# Patient Record
Sex: MALE | Race: WHITE | Employment: FULL TIME | URBAN - METROPOLITAN AREA
[De-identification: names, ages, dates, MRNs, and addresses within clinical notes are randomized per-mention and may not be internally consistent; named-entity substitution may affect disease eponyms.]

---

## 2024-04-25 ENCOUNTER — APPOINTMENT (OUTPATIENT)
Dept: MRI IMAGING | Facility: HOSPITAL | Age: 64
End: 2024-04-25
Attending: EMERGENCY MEDICINE
Payer: COMMERCIAL

## 2024-04-25 ENCOUNTER — HOSPITAL ENCOUNTER (INPATIENT)
Facility: HOSPITAL | Age: 64
LOS: 1 days | Discharge: HOME | End: 2024-04-26
Attending: EMERGENCY MEDICINE | Admitting: HOSPITALIST
Payer: COMMERCIAL

## 2024-04-25 DIAGNOSIS — Z98.890 S/P LUMBAR MICRODISCECTOMY: Primary | ICD-10-CM

## 2024-04-25 DIAGNOSIS — M51.36 L4-L5 DISC BULGE: ICD-10-CM

## 2024-04-25 DIAGNOSIS — M54.16 LUMBAR RADICULOPATHY: ICD-10-CM

## 2024-04-25 PROBLEM — M79.605 LEFT LEG PAIN: Status: ACTIVE | Noted: 2024-04-25

## 2024-04-25 PROBLEM — R73.9 HYPERGLYCEMIA: Status: ACTIVE | Noted: 2024-04-25

## 2024-04-25 PROBLEM — M51.369 L4-L5 DISC BULGE: Status: ACTIVE | Noted: 2024-04-25

## 2024-04-25 LAB
ANION GAP SERPL CALC-SCNC: 8 MMOL/L (ref 0–18)
APTT PPP: 27.6 SECONDS (ref 23.3–35.6)
BASOPHILS # BLD AUTO: 0.02 X10(3) UL (ref 0–0.2)
BASOPHILS NFR BLD AUTO: 0.4 %
BUN BLD-MCNC: 11 MG/DL (ref 9–23)
BUN/CREAT SERPL: 13.8 (ref 10–20)
CALCIUM BLD-MCNC: 9.4 MG/DL (ref 8.7–10.4)
CHLORIDE SERPL-SCNC: 106 MMOL/L (ref 98–112)
CO2 SERPL-SCNC: 26 MMOL/L (ref 21–32)
CREAT BLD-MCNC: 0.8 MG/DL
CRP SERPL-MCNC: 0.5 MG/DL (ref ?–1)
DEPRECATED RDW RBC AUTO: 43.6 FL (ref 35.1–46.3)
EGFRCR SERPLBLD CKD-EPI 2021: 100 ML/MIN/1.73M2 (ref 60–?)
EOSINOPHIL # BLD AUTO: 0.1 X10(3) UL (ref 0–0.7)
EOSINOPHIL NFR BLD AUTO: 2.1 %
ERYTHROCYTE [DISTWIDTH] IN BLOOD BY AUTOMATED COUNT: 14.1 % (ref 11–15)
ERYTHROCYTE [SEDIMENTATION RATE] IN BLOOD: 22 MM/HR
GLUCOSE BLD-MCNC: 101 MG/DL (ref 70–99)
HCT VFR BLD AUTO: 43.9 %
HGB BLD-MCNC: 14.4 G/DL
IMM GRANULOCYTES # BLD AUTO: 0 X10(3) UL (ref 0–1)
IMM GRANULOCYTES NFR BLD: 0 %
INR BLD: 0.97 (ref 0.8–1.2)
LYMPHOCYTES # BLD AUTO: 1.28 X10(3) UL (ref 1–4)
LYMPHOCYTES NFR BLD AUTO: 27.2 %
MCH RBC QN AUTO: 27.9 PG (ref 26–34)
MCHC RBC AUTO-ENTMCNC: 32.8 G/DL (ref 31–37)
MCV RBC AUTO: 84.9 FL
MONOCYTES # BLD AUTO: 1.01 X10(3) UL (ref 0.1–1)
MONOCYTES NFR BLD AUTO: 21.5 %
MRSA DNA SPEC QL NAA+PROBE: NEGATIVE
NEUTROPHILS # BLD AUTO: 2.29 X10 (3) UL (ref 1.5–7.7)
NEUTROPHILS # BLD AUTO: 2.29 X10(3) UL (ref 1.5–7.7)
NEUTROPHILS NFR BLD AUTO: 48.8 %
OSMOLALITY SERPL CALC.SUM OF ELEC: 290 MOSM/KG (ref 275–295)
PLATELET # BLD AUTO: 193 10(3)UL (ref 150–450)
POTASSIUM SERPL-SCNC: 3.9 MMOL/L (ref 3.5–5.1)
PROTHROMBIN TIME: 13.5 SECONDS (ref 11.6–14.8)
RBC # BLD AUTO: 5.17 X10(6)UL
SODIUM SERPL-SCNC: 140 MMOL/L (ref 136–145)
WBC # BLD AUTO: 4.7 X10(3) UL (ref 4–11)

## 2024-04-25 PROCEDURE — 99255 IP/OBS CONSLTJ NEW/EST HI 80: CPT | Performed by: NEUROLOGICAL SURGERY

## 2024-04-25 PROCEDURE — 72148 MRI LUMBAR SPINE W/O DYE: CPT | Performed by: EMERGENCY MEDICINE

## 2024-04-25 PROCEDURE — 99223 1ST HOSP IP/OBS HIGH 75: CPT | Performed by: HOSPITALIST

## 2024-04-25 RX ORDER — MORPHINE SULFATE 2 MG/ML
1 INJECTION, SOLUTION INTRAMUSCULAR; INTRAVENOUS EVERY 2 HOUR PRN
Status: DISCONTINUED | OUTPATIENT
Start: 2024-04-25 | End: 2024-04-26

## 2024-04-25 RX ORDER — LEVOTHYROXINE SODIUM 0.05 MG/1
50 TABLET ORAL
Status: DISCONTINUED | OUTPATIENT
Start: 2024-04-25 | End: 2024-04-26

## 2024-04-25 RX ORDER — NAPROXEN 500 MG/1
500 TABLET ORAL EVERY 12 HOURS
Status: DISCONTINUED | OUTPATIENT
Start: 2024-04-26 | End: 2024-04-26

## 2024-04-25 RX ORDER — ENEMA 19; 7 G/133ML; G/133ML
1 ENEMA RECTAL ONCE AS NEEDED
Status: DISCONTINUED | OUTPATIENT
Start: 2024-04-25 | End: 2024-04-26

## 2024-04-25 RX ORDER — ALLOPURINOL 300 MG/1
300 TABLET ORAL DAILY
Status: DISCONTINUED | OUTPATIENT
Start: 2024-04-25 | End: 2024-04-26

## 2024-04-25 RX ORDER — KETOROLAC TROMETHAMINE 15 MG/ML
15 INJECTION, SOLUTION INTRAMUSCULAR; INTRAVENOUS EVERY 6 HOURS
Status: DISPENSED | OUTPATIENT
Start: 2024-04-25 | End: 2024-04-26

## 2024-04-25 RX ORDER — SENNOSIDES 8.6 MG
17.2 TABLET ORAL NIGHTLY PRN
Status: DISCONTINUED | OUTPATIENT
Start: 2024-04-25 | End: 2024-04-26

## 2024-04-25 RX ORDER — MORPHINE SULFATE 2 MG/ML
2 INJECTION, SOLUTION INTRAMUSCULAR; INTRAVENOUS EVERY 2 HOUR PRN
Status: DISCONTINUED | OUTPATIENT
Start: 2024-04-25 | End: 2024-04-26

## 2024-04-25 RX ORDER — ALLOPURINOL 300 MG/1
300 TABLET ORAL DAILY
COMMUNITY

## 2024-04-25 RX ORDER — METHYLPREDNISOLONE 4 MG/1
TABLET ORAL
Qty: 1 EACH | Refills: 0 | Status: SHIPPED | OUTPATIENT
Start: 2024-04-25

## 2024-04-25 RX ORDER — AMOXICILLIN AND CLAVULANATE POTASSIUM 875; 125 MG/1; MG/1
1 TABLET, FILM COATED ORAL 3 TIMES DAILY
Status: ON HOLD | COMMUNITY
Start: 2024-04-15 | End: 2024-04-25

## 2024-04-25 RX ORDER — HYDROCODONE BITARTRATE AND ACETAMINOPHEN 5; 325 MG/1; MG/1
2 TABLET ORAL EVERY 4 HOURS PRN
Status: DISCONTINUED | OUTPATIENT
Start: 2024-04-25 | End: 2024-04-26

## 2024-04-25 RX ORDER — PROCHLORPERAZINE EDISYLATE 5 MG/ML
5 INJECTION INTRAMUSCULAR; INTRAVENOUS EVERY 8 HOURS PRN
Status: DISCONTINUED | OUTPATIENT
Start: 2024-04-25 | End: 2024-04-26

## 2024-04-25 RX ORDER — LISINOPRIL 20 MG/1
20 TABLET ORAL DAILY
Status: DISCONTINUED | OUTPATIENT
Start: 2024-04-25 | End: 2024-04-26

## 2024-04-25 RX ORDER — KETOROLAC TROMETHAMINE 30 MG/ML
30 INJECTION, SOLUTION INTRAMUSCULAR; INTRAVENOUS ONCE
Status: COMPLETED | OUTPATIENT
Start: 2024-04-25 | End: 2024-04-25

## 2024-04-25 RX ORDER — DIAZEPAM 5 MG/ML
5 INJECTION, SOLUTION INTRAMUSCULAR; INTRAVENOUS ONCE
Status: COMPLETED | OUTPATIENT
Start: 2024-04-25 | End: 2024-04-25

## 2024-04-25 RX ORDER — LISINOPRIL 20 MG/1
20 TABLET ORAL DAILY
COMMUNITY

## 2024-04-25 RX ORDER — MORPHINE SULFATE 4 MG/ML
4 INJECTION, SOLUTION INTRAMUSCULAR; INTRAVENOUS ONCE
Status: COMPLETED | OUTPATIENT
Start: 2024-04-25 | End: 2024-04-25

## 2024-04-25 RX ORDER — BISACODYL 10 MG
10 SUPPOSITORY, RECTAL RECTAL
Status: DISCONTINUED | OUTPATIENT
Start: 2024-04-25 | End: 2024-04-26

## 2024-04-25 RX ORDER — METHYLPREDNISOLONE SODIUM SUCCINATE 125 MG/2ML
125 INJECTION, POWDER, LYOPHILIZED, FOR SOLUTION INTRAMUSCULAR; INTRAVENOUS ONCE
Status: COMPLETED | OUTPATIENT
Start: 2024-04-25 | End: 2024-04-25

## 2024-04-25 RX ORDER — MELATONIN
3 NIGHTLY PRN
Status: DISCONTINUED | OUTPATIENT
Start: 2024-04-25 | End: 2024-04-26

## 2024-04-25 RX ORDER — ONDANSETRON 2 MG/ML
4 INJECTION INTRAMUSCULAR; INTRAVENOUS EVERY 6 HOURS PRN
Status: DISCONTINUED | OUTPATIENT
Start: 2024-04-25 | End: 2024-04-26

## 2024-04-25 RX ORDER — ACETAMINOPHEN 325 MG/1
650 TABLET ORAL EVERY 4 HOURS PRN
Status: DISCONTINUED | OUTPATIENT
Start: 2024-04-25 | End: 2024-04-26

## 2024-04-25 RX ORDER — MORPHINE SULFATE 4 MG/ML
4 INJECTION, SOLUTION INTRAMUSCULAR; INTRAVENOUS EVERY 2 HOUR PRN
Status: DISCONTINUED | OUTPATIENT
Start: 2024-04-25 | End: 2024-04-26

## 2024-04-25 RX ORDER — ATORVASTATIN CALCIUM 20 MG/1
20 TABLET, FILM COATED ORAL DAILY
COMMUNITY

## 2024-04-25 RX ORDER — LEVOTHYROXINE SODIUM 0.05 MG/1
50 TABLET ORAL
COMMUNITY

## 2024-04-25 RX ORDER — MULTIVIT-MIN/IRON FUM/FOLIC AC 7.5 MG-4
1 TABLET ORAL DAILY
Status: ON HOLD | COMMUNITY
End: 2024-04-25

## 2024-04-25 RX ORDER — ATORVASTATIN CALCIUM 20 MG/1
20 TABLET, FILM COATED ORAL DAILY
Status: DISCONTINUED | OUTPATIENT
Start: 2024-04-25 | End: 2024-04-26

## 2024-04-25 RX ORDER — POLYETHYLENE GLYCOL 3350 17 G/17G
17 POWDER, FOR SOLUTION ORAL DAILY PRN
Status: DISCONTINUED | OUTPATIENT
Start: 2024-04-25 | End: 2024-04-26

## 2024-04-25 RX ORDER — HYDROCODONE BITARTRATE AND ACETAMINOPHEN 5; 325 MG/1; MG/1
1 TABLET ORAL EVERY 4 HOURS PRN
Status: DISCONTINUED | OUTPATIENT
Start: 2024-04-25 | End: 2024-04-26

## 2024-04-25 NOTE — RESPIRATORY THERAPY NOTE
DC ASSESSMENT:    Pt does not have a previous diagnosis of DC. Pt does not routinely use a CPAP device at home.     CPAP INITIATION:    Pt to be placed on CPAP: no

## 2024-04-25 NOTE — ED QUICK NOTES
Orders for admission, patient is aware of plan and ready to go upstairs. Any questions, please call ED RAFAEL Amaya at extension [phone].     Patient Covid vaccination status: Fully vaccinated     COVID Test Ordered in ED: None    COVID Suspicion at Admission: N/A    Running Infusions:  None    Mental Status/LOC at time of transport: a/o x 4    Other pertinent information: Pt is from home. Pt is independently ambulatory with steady gait.  CIWA score: N/A   NIH score:  N/A

## 2024-04-25 NOTE — CHRONIC PAIN
Pain Medicine Consult       CC: LBP with LLE radiating pain     HPI: Patient is a 64 year old male w PmHx of HTN, HLD, thyroid disease, who came to ED for left groing and LLE radiating pain, after getting out of his car. The pain described as sharp, is rated as a 6/10 on a NRS. At its best the pain is a 4/10 and at its worst the pain is a 10/10.  Pain is associated with tingling/numbness. Pt underwent Mri and revealed  L4-L5 disc extrusion compressing and effacing the left L5 peripheral nerve root. Pain service consulted for recs.     The patient specifically denies weight loss, changes in bathroom habits, fever, or chills.    Current Outpatient Medications   Medication Sig Dispense Refill    methylPREDNISolone (MEDROL) 4 MG Oral Tablet Therapy Pack Dosepack: take as directed 1 each 0       No Known Allergies    Past Medical History:    Essential hypertension    Gout    Hyperlipidemia    Thyroid disease       Patient Active Problem List   Diagnosis    Left leg pain    Hyperglycemia    L4-L5 disc bulge    Lumbar radiculopathy       Past Surgical History:   Procedure Laterality Date    Excis lumbar disk,one level         Social History     Socioeconomic History    Marital status:      Spouse name: Not on file    Number of children: Not on file    Years of education: Not on file    Highest education level: Not on file   Occupational History    Not on file   Tobacco Use    Smoking status: Never    Smokeless tobacco: Never   Substance and Sexual Activity    Alcohol use: Not Currently    Drug use: Never    Sexual activity: Not on file   Other Topics Concern    Not on file   Social History Narrative    Not on file     Social Determinants of Health     Financial Resource Strain: Low Risk  (3/26/2024)    Received from [facility]    Overall Financial Resource Strain (CARDIA)     Difficulty of Paying Living Expenses: Not hard at all   Food Insecurity: No Food Insecurity (4/25/2024)    Food  Insecurity     Food Insecurity: Never true   Transportation Needs: No Transportation Needs (4/25/2024)    Transportation Needs     Lack of Transportation: No   Physical Activity: Not on file   Stress: Not on file   Social Connections: Not on file   Housing Stability: Low Risk  (4/25/2024)    Housing Stability     Housing Instability: No     Housing Instability Emergency: Not on file       ROS:  Constitutional: denies weight loss, night sweats, fatigue  Eyes: denies visual changes, headache, eye pain, double vision  Ears, nose, mouth, and throat: denies runny nose, frequent nose bleeds, stuffy ears, ear pain, gingival bleeding, sore throat, gingival bleeding  Cardiovascular: denies chest pain, shortness of breath, orthopnea, palpitations, loss of consciousness  Respiratory: denies cough, sputum, wheezing, shortness of breath  Gastrointestinal: denies abdominal pain, bloating, cramping, nausea/vomitting, constipation  Musculoskeletal: denies joint swelling, crepitus, arthritis. + pain  Integumentary: denies pruritis, rashes, lesions, excessive dryness and/or discoloration  Neurological: denies headache, weakness, numbness, pins and needles  Psychiatric: denies depression, changes in sleep patterns, anxiety, difficulty concentrating  Endocrine: denies tremor, sweaty, slow, tired, polydipsia, polyuria  Hematologic/Lymphatic: denies gum bleeding, prolonged/excessive bleeding, petechiae  Allergic/Immunologic: denies difficulty breathing, swelling at the neck/groin       RADIOLOGY REPORTS:   PROCEDURE: MRI SPINE LUMBAR (CPT=72148)     Impression   CONCLUSION:     1. Advanced multilevel degenerative changes of the lumbar spine as detailed. Notable levels as follows:     2. L4-L5:  Inferiorly directed 1.7 cm left paracentral/subarticular zone disc extrusion, which results in severe left lateral recess stenosis and effacement of the traversing left L5 nerve root.  Please correlate for left L5 radiculopathy.   Additional  severe multifactorial spinal canal, moderate bilateral neural foraminal, and mild right lateral recess stenosis at this level.  3. L5-S1:  Moderate left lateral recess stenosis, which is related to a left paracentral/subarticular zone disc protrusion, which results in effacement of the traversing left S1 nerve root.  Additional moderate left and mild-to-moderate right neural  foraminal as well as minor spinal canal stenosis.  4. L2-L3:  Moderate spinal canal with mild-to-moderate right and mild left neural foraminal stenosis.  5. L3-L4:  Moderate right and mild left neural foraminal with mild spinal canal stenosis.     6. Endplate edematous degenerative changes at L4-L5 and to a lesser degree L2-L3.     7. Mild chronic T12 vertebral body compression deformity.     8. Nonspecific red marrow conversion throughout the cervical spine.  Findings can be seen in the setting of hematopoietic disturbances such as anemia or chronic smoking amongst other etiologies.     Dictated by (CST): Abelardo Downey MD on 4/25/2024 at 7:05 AM      Finalized by (CST): Abelardo Downey MD on 4/25/2024 at 7:13 AM         PHYSICAL EXAM:  /85 (BP Location: Left arm)   Pulse 65   Temp 97.5 °F (36.4 °C) (Oral)   Resp 18   Ht 6' 1\" (1.854 m)   Wt 273 lb 11.2 oz (124.1 kg)   SpO2 93%   BMI 36.11 kg/m²     The patient is in no distress. The patient is alert and oriented times three.  Mood and affect are normal.  Examination of the patient's skin and nails is grossly normal.  HEENT: Head is normocephalic, nontraumatic, no pinpoint pupils  CV: regular rate, no pedal edema  Resp: no audible wheezing, normal respiratory effort  Abdomen: Soft, nondistended  Lumbar: Normal lordosis  Negative  point tenderness above the right L2-5 facet joints.  Negative  point tenderness above the left L2-5 facet joints.  Negative  right SI tenderness.  Negative  left SI tenderness.  Negative  bilateral greater trochanteric bursa  tenderness.  Negative  straight leg raise testing on right at 20 degrees as it recreates the radicular symptoms.  Positive  straight leg raise testing on left at 15 degrees as it recreates the radicular symptoms  2+/4 patellar and achilles reflexes on the right  2+/4 patellar and achilles reflexes on the left  5/5 right lower extremity strength   5/5 left lower extremity strength   Sensation is grossly intact to light touch bilateral lower extremities    ASSESSMENT/PLAN:  The patient is a 64-year-old male with lumbar radiculopathy, lumbar disc protrusion:  -pt is not wanting conservative measures   -neuro sx on the case and planning for L4-5 laminectomy with left medial facetectomy, foraminotomy and discectomy in am  -will sign off at this time  -ILPM reviewed     A total of 45 minutes were spent face-to-face with the patient during this encounter and over half of that time was spent on counseling and coordination of care.  Wdw dr to and nursing team     NNEKA Hines  Interventional Pain Management

## 2024-04-25 NOTE — ED PROVIDER NOTES
Patient Seen in: [facility] Emergency Department    History     Chief Complaint   Patient presents with    Leg or Foot Injury     Pain        HPI    64-year-old male presents ER with complaint of left groin as well as leg pain and numbness.  Patient with a past medical history of hyperlipidemia, thyroid disease, gout, hypertension, diverticulitis.  Patient states that he had been self treating himself with antibiotics for what he thought to be epididymitis from groin pain and then Augmentin for left lower quadrant abdominal pain.  Patient states yesterday he got out of his car started to have hip pain rating down his left leg.  Patient complained of numbness rating down only down to his foot.  Patient states he has no history of bulging disc.  Patient denies any trauma.    History reviewed.   Past Medical History:    Essential hypertension    Gout    Hyperlipidemia    Thyroid disease       History reviewed.   Past Surgical History:   Procedure Laterality Date    Excis lumbar disk,one level           Medications :  (Not in a hospital admission)       No family history on file.    Smoking Status:   Social History     Socioeconomic History    Marital status:    Tobacco Use    Smoking status: Never    Smokeless tobacco: Never   Substance and Sexual Activity    Alcohol use: Not Currently    Drug use: Never       ROS  All pertinent positives for the review of systems are mentioned in the HPI  All other organ systems are reviewed and are negative.    Constitutional and vital signs reviewed.      Social History and Family History elements reviewed from today, pertinent positives to the presenting problem noted.    Physical Exam     ED Triage Vitals [04/25/24 0312]   /81   Pulse 64   Resp 18   Temp 97.7 °F (36.5 °C)   Temp src Oral   SpO2 97 %   O2 Device None (Room air)       All measures to prevent infection transmission during my interaction with the patient were taken. The patient was already wearing  a droplet mask on my arrival to the room. Personal protective equipment including droplet mask, eye protection, and gloves were worn throughout the duration of the exam.  Handwashing was performed prior to and after the exam.  Stethoscope and any equipment used during my examination was cleaned with super sani-cloth germicidal wipes following the exam.     Physical Exam  Vitals and nursing note reviewed.   Constitutional:       Appearance: He is well-developed.   HENT:      Head: Normocephalic and atraumatic.      Right Ear: External ear normal.      Left Ear: External ear normal.      Nose: Nose normal.   Eyes:      Conjunctiva/sclera: Conjunctivae normal.      Pupils: Pupils are equal, round, and reactive to light.   Cardiovascular:      Rate and Rhythm: Normal rate and regular rhythm.      Heart sounds: Normal heart sounds.   Pulmonary:      Effort: Pulmonary effort is normal.      Breath sounds: Normal breath sounds.   Abdominal:      General: Bowel sounds are normal.      Palpations: Abdomen is soft.   Musculoskeletal:         General: Normal range of motion.      Cervical back: Normal range of motion and neck supple.   Skin:     General: Skin is warm and dry.   Neurological:      General: No focal deficit present.      Mental Status: He is alert and oriented to person, place, and time.      Cranial Nerves: No cranial nerve deficit.      Sensory: Sensory deficit present.      Motor: No weakness.      Coordination: Coordination normal.      Gait: Gait normal.      Deep Tendon Reflexes: Reflexes are normal and symmetric. Reflexes normal.   Psychiatric:         Behavior: Behavior normal.         Thought Content: Thought content normal.         Judgment: Judgment normal.         ED Course        Labs Reviewed   BASIC METABOLIC PANEL (8) - Abnormal; Notable for the following components:       Result Value    Glucose 101 (*)     All other components within normal limits   SED RATE, WESTERGREN (AUTOMATED) - Abnormal;  Notable for the following components:    Sed Rate 22 (*)     All other components within normal limits   CBC W/ DIFFERENTIAL - Abnormal; Notable for the following components:    Monocyte Absolute 1.01 (*)     All other components within normal limits   C-REACTIVE PROTEIN - Normal   CBC WITH DIFFERENTIAL WITH PLATELET    Narrative:     The following orders were created for panel order CBC With Differential With Platelet.                  Procedure                               Abnormality         Status                                     ---------                               -----------         ------                                     CBC W/ DIFFERENTIAL[[id number]]          Abnormal            Final result                                                 Please view results for these tests on the individual orders.         Imaging Results Available and Reviewed while in ED: No results found.  ED Medications Administered:   Medications   ketorolac (Toradol) 30 MG/ML injection 30 mg (30 mg Intravenous Given 4/25/24 0347)   diazepam (Valium) 5 mg/mL injection 5 mg (5 mg Intravenous Given 4/25/24 0447)   methylPREDNISolone sodium succinate (Solu-MEDROL) injection 125 mg (125 mg Intravenous Given 4/25/24 0437)         MDM     Vitals:    04/25/24 0312 04/25/24 0353 04/25/24 0447 04/25/24 0545   BP: 160/81 139/85 142/84    Pulse: 64   61   Resp: 18      Temp: 97.7 °F (36.5 °C)      TempSrc: Oral      SpO2: 97%   99%   Weight: 122.5 kg      Height: 185.4 cm (6' 1\")        *I personally reviewed and interpreted all ED vitals.  I also personally reviewed all labs and imaging if ordered    Pulse Ox: 97%, Room air, Normal     Monitor Interpretation:   normal sinus rhythm    Differential Diagnosis/ Diagnostic Considerations: Bulging disc, lumbar radiculopathy, saddle anesthesia, cord compression.    Medical Record Review: I personally reviewed available prior medical records for any recent pertinent discharge summaries,  testing, and procedures and reviewed those reports.    Complicating Factors: The patient already has does not have a problem list on file. to contribute to the complexity of this ED evaluation.    Medical Decision Making  Problems Addressed:  Left leg pain: acute illness or injury    Amount and/or Complexity of Data Reviewed  Independent Historian:      Details: History obtained from the spouse who states that the pain has been spontaneously occurred since yesterday.  Patient without any trauma        Condition upon leaving the department: Stable    Disposition and Plan     Clinical Impression:  1. Left leg pain        Disposition:  There is no disposition on file for this visit.    Follow-up:  José Cadena MD  [address] [address]  [phone]    Schedule an appointment as soon as possible for a visit  If symptoms worsen      Medications Prescribed:  Current Discharge Medication List        START taking these medications    Details   methylPREDNISolone (MEDROL) 4 MG Oral Tablet Therapy Pack Dosepack: take as directed  Qty: 1 each, Refills: 0

## 2024-04-25 NOTE — H&P
[facility]    PATIENT'S NAME: JEROD RICCI   ATTENDING PHYSICIAN: Fredis Pang MD   PATIENT ACCOUNT#:   [id number]    LOCATION:  [address] [place] [address]  MEDICAL RECORD #:   [id number]       YOB: 1960  ADMISSION DATE:       04/25/2024    HISTORY AND PHYSICAL EXAMINATION    HISTORY OF PRESENT ILLNESS:  The patient is a 64-year-old male with a past medical history of hypertension who had come to the hospital endorsing left groin as well as left leg pain and numbness.  The patient does have a history of hyperlipidemia and thyroid disease and hypertension, as well as diverticulitis.  He has been self-treating himself with antibiotics for what he had initially thought to be epididymitis from the groin pain and then augmented for left lower quadrant abdominal pain.  Yesterday he got out of his car and then he started having pain down his left leg, and that is what brought him to the emergency department.  In the emergency department, the patient underwent an MRI of the lumbar spine and was found to have an L4-L5 disc extrusion compressing and effacing the left L5 peripheral nerve root; no signs of cord compression.  Found to have multilevel disc disease.  Neurosurgery was placed on consult.  The patient was admitted to the hospital and started on IV analgesics, IV antiemetics, and for further monitoring.  The patient was seen and examined on the medical floor, still endorsing pain, requesting pain.  Denied any chest pain, shortness of breath, palpitations, nausea, or vomiting at this time.    PAST MEDICAL HISTORY:  Positive for hypertension, gout, hyperlipidemia, history of thyroid disease, and history of diverticulitis.    PAST SURGICAL HISTORY:  The patient does have an excision of lumbar disc done in the past.    MEDICATIONS:  Home medications have been reviewed and reconciled.  Please refer to the patient's chart for a detailed review regarding the patient's home medications.     ALLERGIES:  No  known drug allergies.    FAMILY HISTORY:  Reviewed and noncontributory to the case.    SOCIAL HISTORY:  The patient is negative for illicits, tobacco, or alcohol.    REVIEW OF SYSTEMS:  A 10-point review of systems has been obtained and is otherwise negative.       PHYSICAL EXAMINATION:    GENERAL:  The patient is lying in bed.  He appears to be in mild distress at this time.  He is A and O x3.  VITAL SIGNS:  The patient's temperature is 97.7, pulse 61, respirations 18, blood pressure 134/75, saturating 96% on room air.  HEENT:  Extraocular movements are intact.  Pupils equal, round, and reactive to light and accommodation.  Atraumatic, normocephalic.    LUNGS:  Good air entry bilaterally.   CARDIAC:  S1, S2 appreciated.  ABDOMEN:  Soft, nontender, nondistended.  Positive bowel sounds.   EXTREMITIES:  Peripheral pulses are positive.  NEUROLOGIC:  The patient does have some sensory deficit present, but motor function is intact.    SKIN:  No rashes are noted.  PSYCHIATRIC:  Appropriate affect.    LABORATORY DATA:  The patient's glucose is 101, sodium 140, potassium 3.9, chloride 106, carbon dioxide 26, BUN 11, creatinine 0.8.  WBC's 4.7, hemoglobin 15.4, hematocrit 43.9, platelets are 193.  Sed rate is elevated to 22.      MRI results as above.      ASSESSMENT AND PLAN:  The patient is a 64-year-old male with a past medical history of hypertension, hypothyroidism, history of diverticulitis, who has been admitted to the hospital with intractable lower back pain.    1.   Intractable lower back pain.  MRI results have been reviewed, consistent with disc protrusion.  At this time we will continue IV analgesics, IV antiemetics as needed.  Neurosurgery has been placed on consult. We will defer to their recommendations. We will continue to follow the patient closely.   2. Hypertension.  If hypertensive, resume patient's oral antihypertensives.  3.   For history of gout, we will resume the patient's gout regimen.  4.   The  patient was self-treating himself for diverticulitis that he initially thought it would be due to that.  At this time, we will hold p.o. antibiotics.  We will continue to monitor closely.  4.   Venous thromboembolism prophylaxis.  We will hold pharmacologic VTE prophylaxis in the setting of possible intervention.    5.   Disposition:  At this time we will monitor closely.  The patient is a Full Code.  Further recommendations to follow.    Greater than 75 minutes was spent with greater than 50% of time spent in face-to-face.     Dictated By Fredis Pang MD  d: 04/25/2024 10:29:57  t: 04/25/2024 10:45:14  [facility] [id number]  [facility]/

## 2024-04-25 NOTE — PLAN OF CARE
Problem: Patient Centered Care  Goal: Patient preferences are identified and integrated in the patient's plan of care  Description: Interventions:  - What would you like us to know as we care for you?   - Provide timely, complete, and accurate information to patient/family  - Incorporate patient and family knowledge, values, beliefs, and cultural backgrounds into the planning and delivery of care  - Encourage patient/family to participate in care and decision-making at the level they choose  - Honor patient and family perspectives and choices  Outcome: Progressing     Problem: Patient/Family Goals  Goal: Patient/Family Long Term Goal  Description: Patient's Long Term Goal: Pain relief!    Interventions:  - Pain level is assess using pain scale from 1 to 10.  - Medicated as needed for pain or discomfort.  - Pain on consult.   - See additional Care Plan goals for specific interventions  Outcome: Progressing  Goal: Patient/Family Short Term Goal  Description: Patient's Short Term Goal: Safe & free of falls!    Interventions:   - Call light within reach at all times.   - Non-skid socks are worn at all times.   - See additional Care Plan goals for specific interventions  Outcome: Progressing     Patient is presently resting in the bed. Alert x 4. Vital signs taken and stable. Tolerates diet well. Patient receives intravenous antibiotics per order. Patient is self care and independent. Patient will be nothing by mouth after midnight for surgical procedure on tomorrow. Patient is medicated as needed for pain or discomfort. Consent for procedure has been signed and witnessed by this RN and placed in the chart. Call light within reach at all times.

## 2024-04-25 NOTE — PLAN OF CARE
KENNETH OLVERA M.D., F.A.A.N.S.     of Neurosurgery  [facility]  Board Certified Neurosurgeon                          [facility]  part of [facility]        [place] [facility]      [address]  [address]  [place] [address]    PHONE  [phone]          FAX  [phone]    [email or web address]      NEUROSURGERY INITIAL PLAN OF CARE    Maxwell Cameron Patient Status:  Observation    1960 MRN [id number]   Location [facility] 5SW/SE Attending Fredis Pang MD   Hosp Day # 0 PCP DANA LEHMAN, CALDER[place]     Patient with disc herniation at L4-5 and severe left lateral recess stenosis.  Discussed this with the pain service.  Patient is not interested in an epidural steroid injection and is quite debilitated secondary to the radiculopathy.  Plan for L4-5 laminectomy with left medial facetectomy, foraminotomy and discectomy utilizing the microscope tomorrow.        Kenneth Olvera M.D., F.A.A.N.S.    2024  10:38 AM    This note has been dictated utilizing voice recognition software. Unfortunately, this may lead to occasional typographical errors. If there are any questions regarding this, please do not hesitate to contact our office.

## 2024-04-25 NOTE — CONSULTS
[facility] Neurosurgery Consult    Maxwell Cameron Patient Status:  Observation    1960 MRN [id number]   Location [facility] 5SW/SE Attending Fredis Pang MD   Hosp Day # 0 PCP CALDERON CORTEZ MD       REASON FOR CONSULTATION: Lumbar radiculopathy, lumbar disc herniation       HISTORY OF PRESENT ILLNESS:  Maxwell Cameron is a(n) 64 year old male with a pertinent PMH of HTN and left L4-5 microdiscectomy in  at [facility].  The patient endorses no cardiac history or history of MI.  No history of stroke.  Endorses no daily use of antiplatelet/anticoagulation therapy.    Who presents to the ED endorsing left lower extremity pain, numbness and tingling.  MRI lumbar spine with inferiorly directed 1.7 cm left paracentral/subarticular zone disc extrusion, which results in severe left lateral recess stenosis and effacement of the traversing left L5 nerve root.  Please correlate for left L5 radiculopathy.  Additional severe multifactorial spinal canal, moderate bilateral neuroforaminal, and mild right lateral recess stenosis at this level. Patient with multi level lumbar DDD as noted per report, please see for further details.     Onset yesterday morning.  No inciting event.  The pain is located in the left glute with radiation down the outer and posterior aspect of the thigh and calf to the bottom of the foot.  Tingling at the bottom of the foot.  Endorses numbness as well.  No right lower extremity pain, numbness, tingling or weakness.  No low back pain, he states is more left glute pain.  No gait instability.  No neck pain or radiating upper extremity pain.  No hand numbness, tingling or weakness.  No bladder/bowel incontinence/retention.    PAST MEDICAL HISTORY:  Past Medical History:    Essential hypertension    Gout    Hyperlipidemia    Thyroid disease       PAST SURGICAL HISTORY:  Past Surgical History:   Procedure Laterality Date    Excis lumbar disk,one level         FAMILY HISTORY:  family history is  not on file.    SOCIAL HISTORY:   reports that he has never smoked. He has never used smokeless tobacco. He reports that he does not currently use alcohol. He reports that he does not use drugs.    ALLERGIES:  No Known Allergies    MEDICATIONS:  Medications Prior to Admission   Medication Sig Dispense Refill Last Dose    atorvastatin 20 MG Oral Tab Take 1 tablet (20 mg total) by mouth daily.   4/25/2024 at 0300    lisinopril 20 MG Oral Tab Take 1 tablet (20 mg total) by mouth daily.   4/25/2024 at 0300    allopurinol 300 MG Oral Tab Take 1 tablet (300 mg total) by mouth daily.   4/25/2024 at 0300    levothyroxine 50 MCG Oral Tab Take 1 tablet (50 mcg total) by mouth before breakfast.   4/25/2024 at 0300     Current Facility-Administered Medications   Medication Dose Route Frequency    acetaminophen (Tylenol) tab 650 mg  650 mg Oral Q4H PRN    Or    HYDROcodone-acetaminophen (Norco) 5-325 MG per tab 1 tablet  1 tablet Oral Q4H PRN    Or    HYDROcodone-acetaminophen (Norco) 5-325 MG per tab 2 tablet  2 tablet Oral Q4H PRN    morphINE PF 2 MG/ML injection 1 mg  1 mg Intravenous Q2H PRN    Or    morphINE PF 2 MG/ML injection 2 mg  2 mg Intravenous Q2H PRN    Or    morphINE PF 4 MG/ML injection 4 mg  4 mg Intravenous Q2H PRN    melatonin tab 3 mg  3 mg Oral Nightly PRN    polyethylene glycol (PEG 3350) (Miralax) 17 g oral packet 17 g  17 g Oral Daily PRN    sennosides (Senokot) tab 17.2 mg  17.2 mg Oral Nightly PRN    bisacodyl (Dulcolax) 10 MG rectal suppository 10 mg  10 mg Rectal Daily PRN    fleet enema (Fleet) 7-19 GM/118ML rectal enema 133 mL  1 enema Rectal Once PRN    ondansetron (Zofran) 4 MG/2ML injection 4 mg  4 mg Intravenous Q6H PRN    prochlorperazine (Compazine) 10 MG/2ML injection 5 mg  5 mg Intravenous Q8H PRN    ketorolac (Toradol) 15 MG/ML injection 15 mg  15 mg Intravenous Q6H    Followed by    [START ON 4/26/2024] naproxen (Naprosyn) tab 500 mg  500 mg Oral Q12H    allopurinol (Zyloprim) tab 300 mg   300 mg Oral Daily    atorvastatin (Lipitor) tab 20 mg  20 mg Oral Daily    levothyroxine (Synthroid) tab 50 mcg  50 mcg Oral Before breakfast    lisinopril (Prinivil; Zestril) tab 20 mg  20 mg Oral Daily    piperacillin-tazobactam (Zosyn) 4.5 g in dextrose 5% 100 mL IVPB-ADDV  4.5 g Intravenous Q8H       REVIEW OF SYSTEMS:  Comprehensive Review of Systems obtained, and is negative other than that mentioned in the History of Present Illness.      PHYSICAL EXAMINATION:  VITAL SIGNS: /85 (BP Location: Left arm)   Pulse 65   Temp 97.5 °F (36.4 °C) (Oral)   Resp 18   Ht 73\"   Wt 273 lb 11.2 oz (124.1 kg)   SpO2 93%   BMI 36.11 kg/m²   GENERAL:  Pleasant patient is a 64 year old male in no acute distress. Laying comfortably in bed.   HEENT:  Normocephalic, atraumatic  RESP: Easy and even   NEUROLOGICAL:  This patient is alert and orientated.  Speech fluent.  Comprehension intact.  Answers questions appropriately.  Easily follows commands.    Upper extremity strength:    Deltoid  Biceps  Triceps     Intrinsics      Right 5 5 5 5 5     Left 5 5 5 5 5     Lower extremity strength:     Iliopsoas  Hamstrings   Quads    D-flexion P-flexion Great Toe   Right       5         5       5         5 5 5   Left       5         5       5         5 5 5     No clonus  Bilateral upper and lower extremity sensation intact and symmetric, except endorses \"tingling\" during assessment of right plantar aspect of the foot    DIAGNOSTIC DATA:   Lab Results   Component Value Date    WBC 4.7 04/25/2024    HGB 14.4 04/25/2024    HCT 43.9 04/25/2024    .0 04/25/2024    CREATSERUM 0.80 04/25/2024    BUN 11 04/25/2024     04/25/2024    K 3.9 04/25/2024     04/25/2024    CO2 26.0 04/25/2024     04/25/2024    CA 9.4 04/25/2024    PTT 27.6 04/25/2024    INR 0.97 04/25/2024    PTP 13.5 04/25/2024    ESRML 22 04/25/2024    CRP 0.50 04/25/2024       IMAGING:    Study Result    Narrative   PROCEDURE: MRI SPINE LUMBAR  (CPT=72148)     COMPARISON: None.     INDICATIONS: eval for radicular symptoms left leg     TECHNIQUE: A variety of imaging planes and parameters were utilized for visualization of suspected pathology.     FINDINGS:  NUMERATION: For the purposes of this examination, the lowest fully formed disc space is designated as L5-S1.  ALIGNMENT: There is preservation of the expected lumbar lordosis.  BONES: No acute lumbar spine fracture.  Endplate edematous degenerative changes at L4-L5 and to a lesser degree L2-L3.  Mild chronic T12 vertebral body compression fracture.  Nonspecific red marrow conversion throughout the lumbar spine.  CORD/CAUDA EQUINA: The distal cord and nerve roots have normal caliber, contour, and signal intensity.  PARASPINAL AREA: No visible mass.    OTHER: Probable right renal cyst.     LUMBAR DISC LEVELS:  L1-L2: Diffuse disc bulge with superimposed central disc protrusion in addition to slight ligamentum flavum redundancy and mild bilateral facet arthropathy.  No significant spinal canal or neural foraminal stenosis.  L2-L3: Right eccentric disc bulge with ligamentum flavum redundancy, mild dorsal epidural lipomatosis, and mild bilateral facet arthropathy.  Mild-to-moderate right and mild left neural foraminal with moderate spinal canal stenosis.  L3-L4: Diffuse disc bulge with ligamentum flavum redundancy, mild dorsal epidural lipomatosis, and bilateral facet arthropathy.  Mild spinal canal with moderate right and mild left neural foraminal stenosis.  L4-L5: Large diffuse disc bulge with superimposed inferiorly directed 1.7 cm left paracentral/subarticular zone disc extrusion in addition to ligamentum flavum redundancy, mild dorsal epidural lipomatosis, and bilateral facet arthropathy.  Severe left  lateral recess stenosis with effacement of the traversing left L5 nerve root.  There is also severe multifactorial spinal canal stenosis.  Moderate bilateral neural foraminal and mild right lateral  recess stenosis.  L5-S1: Diffuse disc bulge with superimposed left paracentral/subarticular zone disc protrusion/extrusion in addition to ligamentum flavum redundancy and bilateral facet arthropathy.  Moderate left lateral recess stenosis with effacement of the traversing   left S1 nerve root.  There is also minor spinal canal stenosis.  Moderate left and mild-to-moderate right neural foraminal stenosis.               Impression   CONCLUSION:     1. Advanced multilevel degenerative changes of the lumbar spine as detailed. Notable levels as follows:     2. L4-L5:  Inferiorly directed 1.7 cm left paracentral/subarticular zone disc extrusion, which results in severe left lateral recess stenosis and effacement of the traversing left L5 nerve root.  Please correlate for left L5 radiculopathy.  Additional  severe multifactorial spinal canal, moderate bilateral neural foraminal, and mild right lateral recess stenosis at this level.  3. L5-S1:  Moderate left lateral recess stenosis, which is related to a left paracentral/subarticular zone disc protrusion, which results in effacement of the traversing left S1 nerve root.  Additional moderate left and mild-to-moderate right neural  foraminal as well as minor spinal canal stenosis.  4. L2-L3:  Moderate spinal canal with mild-to-moderate right and mild left neural foraminal stenosis.  5. L3-L4:  Moderate right and mild left neural foraminal with mild spinal canal stenosis.     6. Endplate edematous degenerative changes at L4-L5 and to a lesser degree L2-L3.     7. Mild chronic T12 vertebral body compression deformity.     8. Nonspecific red marrow conversion throughout the cervical spine.  Findings can be seen in the setting of hematopoietic disturbances such as anemia or chronic smoking amongst other etiologies.        elm-remote     Dictated by (CST): Abelardo Downey MD on 4/25/2024 at 7:05 AM      Finalized by (CST): Abelardo Downey MD on 4/25/2024 at 7:13 AM         ASSESSMENT:  Patient Active Problem List   Diagnosis    Left leg pain    Hyperglycemia    L4-L5 disc bulge    Lumbar radiculopathy     63 y/o male who presents to the ED with left LE pain, numbness and tingling, MRI with a large inferiorly directed L4-5 disc herniation with spinal stenosis. Plan to proceed with surgical intervention tomorrow with Dr. Liao  History of L4-5 left microdiscectomy in 2010 at [facility]      Plan:  Plan for neurosurgical intervention with Dr. Liao tomorrow  N.p.o. at midnight  Hold anticoagulation/antiplatelet therapy  PT/OT if patient able to tolerate  Ambulate 4 times daily as tolerated  Out of bed to chair for all meals as tolerated  Medical management per hospitalist  Pain management as ordered  Discussed with Dr. Liao    The patient was seen and examined.  Patient agreed to the plan, verbalized understanding was very appreciative.      Kenia Mondragon M.S., PA-C  [address], [address] [address]  [phone]  4/25/2024 12:53 PM    Dragon speech recognition software was used to prepare this note. If a word or phrase is confusing, it is likely due to a failure of recognition. Please contact me with any questions or clarifications.    Is this a shared or split note between Advanced Practice Provider and Physician? Yes

## 2024-04-25 NOTE — ED PROVIDER NOTES
MRI scan shows L4-L5 disc extrusion compressing and effacing the left L5 peripheral nerve root.  No signs of cord compression.  Multilevel disc disease.  Discussed results with patient.  Originally requested admission to the hospital for consideration for surgery and discussed with the hospitalist and neurosurgery.

## 2024-04-26 ENCOUNTER — ANESTHESIA (OUTPATIENT)
Dept: SURGERY | Facility: HOSPITAL | Age: 64
End: 2024-04-26
Payer: COMMERCIAL

## 2024-04-26 ENCOUNTER — APPOINTMENT (OUTPATIENT)
Dept: GENERAL RADIOLOGY | Facility: HOSPITAL | Age: 64
End: 2024-04-26
Attending: NEUROLOGICAL SURGERY
Payer: COMMERCIAL

## 2024-04-26 ENCOUNTER — ANESTHESIA EVENT (OUTPATIENT)
Dept: SURGERY | Facility: HOSPITAL | Age: 64
End: 2024-04-26
Payer: COMMERCIAL

## 2024-04-26 VITALS
HEART RATE: 64 BPM | HEIGHT: 73 IN | DIASTOLIC BLOOD PRESSURE: 80 MMHG | SYSTOLIC BLOOD PRESSURE: 139 MMHG | TEMPERATURE: 98 F | RESPIRATION RATE: 16 BRPM | BODY MASS INDEX: 36.27 KG/M2 | WEIGHT: 273.69 LBS | OXYGEN SATURATION: 98 %

## 2024-04-26 LAB
ANION GAP SERPL CALC-SCNC: 7 MMOL/L (ref 0–18)
BASOPHILS # BLD AUTO: 0.02 X10(3) UL (ref 0–0.2)
BASOPHILS NFR BLD AUTO: 0.3 %
BUN BLD-MCNC: 14 MG/DL (ref 9–23)
BUN/CREAT SERPL: 15.7 (ref 10–20)
CALCIUM BLD-MCNC: 9.5 MG/DL (ref 8.7–10.4)
CHLORIDE SERPL-SCNC: 106 MMOL/L (ref 98–112)
CO2 SERPL-SCNC: 27 MMOL/L (ref 21–32)
CREAT BLD-MCNC: 0.89 MG/DL
DEPRECATED RDW RBC AUTO: 45.3 FL (ref 35.1–46.3)
EGFRCR SERPLBLD CKD-EPI 2021: 97 ML/MIN/1.73M2 (ref 60–?)
EOSINOPHIL # BLD AUTO: 0.02 X10(3) UL (ref 0–0.7)
EOSINOPHIL NFR BLD AUTO: 0.3 %
ERYTHROCYTE [DISTWIDTH] IN BLOOD BY AUTOMATED COUNT: 14.4 % (ref 11–15)
GLUCOSE BLD-MCNC: 88 MG/DL (ref 70–99)
HCT VFR BLD AUTO: 45.2 %
HGB BLD-MCNC: 14.8 G/DL
IMM GRANULOCYTES # BLD AUTO: 0.02 X10(3) UL (ref 0–1)
IMM GRANULOCYTES NFR BLD: 0.3 %
LYMPHOCYTES # BLD AUTO: 2.04 X10(3) UL (ref 1–4)
LYMPHOCYTES NFR BLD AUTO: 26.9 %
MAGNESIUM SERPL-MCNC: 2 MG/DL (ref 1.6–2.6)
MCH RBC QN AUTO: 28.1 PG (ref 26–34)
MCHC RBC AUTO-ENTMCNC: 32.7 G/DL (ref 31–37)
MCV RBC AUTO: 85.9 FL
MONOCYTES # BLD AUTO: 0.98 X10(3) UL (ref 0.1–1)
MONOCYTES NFR BLD AUTO: 12.9 %
NEUTROPHILS # BLD AUTO: 4.5 X10 (3) UL (ref 1.5–7.7)
NEUTROPHILS # BLD AUTO: 4.5 X10(3) UL (ref 1.5–7.7)
NEUTROPHILS NFR BLD AUTO: 59.3 %
OSMOLALITY SERPL CALC.SUM OF ELEC: 290 MOSM/KG (ref 275–295)
PLATELET # BLD AUTO: 221 10(3)UL (ref 150–450)
POTASSIUM SERPL-SCNC: 3.9 MMOL/L (ref 3.5–5.1)
RBC # BLD AUTO: 5.26 X10(6)UL
SODIUM SERPL-SCNC: 140 MMOL/L (ref 136–145)
WBC # BLD AUTO: 7.6 X10(3) UL (ref 4–11)

## 2024-04-26 PROCEDURE — 01NB0ZZ RELEASE LUMBAR NERVE, OPEN APPROACH: ICD-10-PCS | Performed by: NEUROLOGICAL SURGERY

## 2024-04-26 PROCEDURE — 01NR0ZZ RELEASE SACRAL NERVE, OPEN APPROACH: ICD-10-PCS | Performed by: NEUROLOGICAL SURGERY

## 2024-04-26 PROCEDURE — 99233 SBSQ HOSP IP/OBS HIGH 50: CPT | Performed by: HOSPITALIST

## 2024-04-26 PROCEDURE — 00NY0ZZ RELEASE LUMBAR SPINAL CORD, OPEN APPROACH: ICD-10-PCS | Performed by: NEUROLOGICAL SURGERY

## 2024-04-26 PROCEDURE — 76000 FLUOROSCOPY <1 HR PHYS/QHP: CPT | Performed by: NEUROLOGICAL SURGERY

## 2024-04-26 PROCEDURE — 0SB40ZZ EXCISION OF LUMBOSACRAL DISC, OPEN APPROACH: ICD-10-PCS | Performed by: NEUROLOGICAL SURGERY

## 2024-04-26 PROCEDURE — 99239 HOSP IP/OBS DSCHRG MGMT >30: CPT | Performed by: HOSPITALIST

## 2024-04-26 PROCEDURE — 0SB20ZZ EXCISION OF LUMBAR VERTEBRAL DISC, OPEN APPROACH: ICD-10-PCS | Performed by: NEUROLOGICAL SURGERY

## 2024-04-26 RX ORDER — ONDANSETRON 2 MG/ML
4 INJECTION INTRAMUSCULAR; INTRAVENOUS EVERY 6 HOURS PRN
Status: DISCONTINUED | OUTPATIENT
Start: 2024-04-26 | End: 2024-04-26

## 2024-04-26 RX ORDER — PROCHLORPERAZINE EDISYLATE 5 MG/ML
5 INJECTION INTRAMUSCULAR; INTRAVENOUS EVERY 8 HOURS PRN
Status: DISCONTINUED | OUTPATIENT
Start: 2024-04-26 | End: 2024-04-26 | Stop reason: HOSPADM

## 2024-04-26 RX ORDER — SENNOSIDES 8.6 MG
17.2 TABLET ORAL NIGHTLY
Status: DISCONTINUED | OUTPATIENT
Start: 2024-04-26 | End: 2024-04-26

## 2024-04-26 RX ORDER — OXYCODONE HYDROCHLORIDE 5 MG/1
10 TABLET ORAL EVERY 4 HOURS PRN
Status: DISCONTINUED | OUTPATIENT
Start: 2024-04-26 | End: 2024-04-26

## 2024-04-26 RX ORDER — POLYETHYLENE GLYCOL 3350 17 G/17G
17 POWDER, FOR SOLUTION ORAL DAILY PRN
Status: DISCONTINUED | OUTPATIENT
Start: 2024-04-26 | End: 2024-04-26

## 2024-04-26 RX ORDER — MORPHINE SULFATE 4 MG/ML
4 INJECTION, SOLUTION INTRAMUSCULAR; INTRAVENOUS EVERY 10 MIN PRN
Status: DISCONTINUED | OUTPATIENT
Start: 2024-04-26 | End: 2024-04-26 | Stop reason: HOSPADM

## 2024-04-26 RX ORDER — ONDANSETRON 2 MG/ML
INJECTION INTRAMUSCULAR; INTRAVENOUS AS NEEDED
Status: DISCONTINUED | OUTPATIENT
Start: 2024-04-26 | End: 2024-04-26 | Stop reason: SURG

## 2024-04-26 RX ORDER — MEPERIDINE HYDROCHLORIDE 25 MG/ML
12.5 INJECTION INTRAMUSCULAR; INTRAVENOUS; SUBCUTANEOUS AS NEEDED
Status: DISCONTINUED | OUTPATIENT
Start: 2024-04-26 | End: 2024-04-26 | Stop reason: HOSPADM

## 2024-04-26 RX ORDER — HYDROMORPHONE HYDROCHLORIDE 1 MG/ML
0.6 INJECTION, SOLUTION INTRAMUSCULAR; INTRAVENOUS; SUBCUTANEOUS EVERY 5 MIN PRN
Status: DISCONTINUED | OUTPATIENT
Start: 2024-04-26 | End: 2024-04-26 | Stop reason: HOSPADM

## 2024-04-26 RX ORDER — HYDROMORPHONE HYDROCHLORIDE 1 MG/ML
0.2 INJECTION, SOLUTION INTRAMUSCULAR; INTRAVENOUS; SUBCUTANEOUS EVERY 5 MIN PRN
Status: DISCONTINUED | OUTPATIENT
Start: 2024-04-26 | End: 2024-04-26 | Stop reason: HOSPADM

## 2024-04-26 RX ORDER — MORPHINE SULFATE 10 MG/ML
6 INJECTION, SOLUTION INTRAMUSCULAR; INTRAVENOUS EVERY 10 MIN PRN
Status: DISCONTINUED | OUTPATIENT
Start: 2024-04-26 | End: 2024-04-26 | Stop reason: HOSPADM

## 2024-04-26 RX ORDER — METHOCARBAMOL 750 MG/1
750 TABLET, FILM COATED ORAL 3 TIMES DAILY
Qty: 30 TABLET | Refills: 0 | Status: SHIPPED | OUTPATIENT
Start: 2024-04-26 | End: 2024-05-06

## 2024-04-26 RX ORDER — HYDROCODONE BITARTRATE AND ACETAMINOPHEN 10; 325 MG/1; MG/1
1 TABLET ORAL EVERY 6 HOURS PRN
Qty: 28 TABLET | Refills: 0 | Status: SHIPPED | OUTPATIENT
Start: 2024-04-26 | End: 2024-05-03

## 2024-04-26 RX ORDER — ENEMA 19; 7 G/133ML; G/133ML
1 ENEMA RECTAL ONCE AS NEEDED
Status: DISCONTINUED | OUTPATIENT
Start: 2024-04-26 | End: 2024-04-26

## 2024-04-26 RX ORDER — PROCHLORPERAZINE EDISYLATE 5 MG/ML
5 INJECTION INTRAMUSCULAR; INTRAVENOUS EVERY 8 HOURS PRN
Status: DISCONTINUED | OUTPATIENT
Start: 2024-04-26 | End: 2024-04-26

## 2024-04-26 RX ORDER — SODIUM CHLORIDE, SODIUM LACTATE, POTASSIUM CHLORIDE, CALCIUM CHLORIDE 600; 310; 30; 20 MG/100ML; MG/100ML; MG/100ML; MG/100ML
INJECTION, SOLUTION INTRAVENOUS CONTINUOUS PRN
Status: DISCONTINUED | OUTPATIENT
Start: 2024-04-26 | End: 2024-04-26 | Stop reason: SURG

## 2024-04-26 RX ORDER — DIPHENHYDRAMINE HYDROCHLORIDE 50 MG/ML
25 INJECTION INTRAMUSCULAR; INTRAVENOUS EVERY 4 HOURS PRN
Status: DISCONTINUED | OUTPATIENT
Start: 2024-04-26 | End: 2024-04-26

## 2024-04-26 RX ORDER — METHOCARBAMOL 750 MG/1
750 TABLET, FILM COATED ORAL 3 TIMES DAILY PRN
Status: DISCONTINUED | OUTPATIENT
Start: 2024-04-26 | End: 2024-04-26

## 2024-04-26 RX ORDER — ROCURONIUM BROMIDE 10 MG/ML
INJECTION, SOLUTION INTRAVENOUS AS NEEDED
Status: DISCONTINUED | OUTPATIENT
Start: 2024-04-26 | End: 2024-04-26 | Stop reason: SURG

## 2024-04-26 RX ORDER — HYDROMORPHONE HYDROCHLORIDE 1 MG/ML
0.4 INJECTION, SOLUTION INTRAMUSCULAR; INTRAVENOUS; SUBCUTANEOUS EVERY 5 MIN PRN
Status: DISCONTINUED | OUTPATIENT
Start: 2024-04-26 | End: 2024-04-26 | Stop reason: HOSPADM

## 2024-04-26 RX ORDER — DOCUSATE SODIUM 100 MG/1
100 CAPSULE, LIQUID FILLED ORAL 2 TIMES DAILY
Status: DISCONTINUED | OUTPATIENT
Start: 2024-04-26 | End: 2024-04-26

## 2024-04-26 RX ORDER — DIPHENHYDRAMINE HCL 25 MG
25 CAPSULE ORAL EVERY 4 HOURS PRN
Status: DISCONTINUED | OUTPATIENT
Start: 2024-04-26 | End: 2024-04-26

## 2024-04-26 RX ORDER — BUPIVACAINE HYDROCHLORIDE AND EPINEPHRINE 2.5; 5 MG/ML; UG/ML
INJECTION, SOLUTION INFILTRATION; PERINEURAL AS NEEDED
Status: DISCONTINUED | OUTPATIENT
Start: 2024-04-26 | End: 2024-04-26 | Stop reason: HOSPADM

## 2024-04-26 RX ORDER — HYDROMORPHONE HYDROCHLORIDE 1 MG/ML
0.8 INJECTION, SOLUTION INTRAMUSCULAR; INTRAVENOUS; SUBCUTANEOUS EVERY 2 HOUR PRN
Status: DISCONTINUED | OUTPATIENT
Start: 2024-04-26 | End: 2024-04-26

## 2024-04-26 RX ORDER — METHYLPREDNISOLONE ACETATE 40 MG/ML
INJECTION, SUSPENSION INTRA-ARTICULAR; INTRALESIONAL; INTRAMUSCULAR; SOFT TISSUE AS NEEDED
Status: DISCONTINUED | OUTPATIENT
Start: 2024-04-26 | End: 2024-04-26 | Stop reason: HOSPADM

## 2024-04-26 RX ORDER — ONDANSETRON 2 MG/ML
4 INJECTION INTRAMUSCULAR; INTRAVENOUS EVERY 6 HOURS PRN
Status: DISCONTINUED | OUTPATIENT
Start: 2024-04-26 | End: 2024-04-26 | Stop reason: HOSPADM

## 2024-04-26 RX ORDER — EPHEDRINE SULFATE 50 MG/ML
INJECTION, SOLUTION INTRAVENOUS AS NEEDED
Status: DISCONTINUED | OUTPATIENT
Start: 2024-04-26 | End: 2024-04-26 | Stop reason: SURG

## 2024-04-26 RX ORDER — OXYCODONE HYDROCHLORIDE 5 MG/1
5 TABLET ORAL EVERY 4 HOURS PRN
Status: DISCONTINUED | OUTPATIENT
Start: 2024-04-26 | End: 2024-04-26

## 2024-04-26 RX ORDER — NALOXONE HYDROCHLORIDE 0.4 MG/ML
80 INJECTION, SOLUTION INTRAMUSCULAR; INTRAVENOUS; SUBCUTANEOUS AS NEEDED
Status: DISCONTINUED | OUTPATIENT
Start: 2024-04-26 | End: 2024-04-26 | Stop reason: HOSPADM

## 2024-04-26 RX ORDER — BISACODYL 10 MG
10 SUPPOSITORY, RECTAL RECTAL
Status: DISCONTINUED | OUTPATIENT
Start: 2024-04-26 | End: 2024-04-26

## 2024-04-26 RX ORDER — MORPHINE SULFATE 4 MG/ML
2 INJECTION, SOLUTION INTRAMUSCULAR; INTRAVENOUS EVERY 10 MIN PRN
Status: DISCONTINUED | OUTPATIENT
Start: 2024-04-26 | End: 2024-04-26 | Stop reason: HOSPADM

## 2024-04-26 RX ORDER — HYDROMORPHONE HYDROCHLORIDE 1 MG/ML
0.4 INJECTION, SOLUTION INTRAMUSCULAR; INTRAVENOUS; SUBCUTANEOUS EVERY 2 HOUR PRN
Status: DISCONTINUED | OUTPATIENT
Start: 2024-04-26 | End: 2024-04-26

## 2024-04-26 RX ORDER — SODIUM CHLORIDE, SODIUM LACTATE, POTASSIUM CHLORIDE, CALCIUM CHLORIDE 600; 310; 30; 20 MG/100ML; MG/100ML; MG/100ML; MG/100ML
INJECTION, SOLUTION INTRAVENOUS CONTINUOUS
Status: DISCONTINUED | OUTPATIENT
Start: 2024-04-26 | End: 2024-04-26 | Stop reason: HOSPADM

## 2024-04-26 RX ORDER — LIDOCAINE HYDROCHLORIDE 40 MG/ML
SOLUTION TOPICAL AS NEEDED
Status: DISCONTINUED | OUTPATIENT
Start: 2024-04-26 | End: 2024-04-26 | Stop reason: SURG

## 2024-04-26 RX ORDER — LIDOCAINE HYDROCHLORIDE 10 MG/ML
INJECTION, SOLUTION EPIDURAL; INFILTRATION; INTRACAUDAL; PERINEURAL AS NEEDED
Status: DISCONTINUED | OUTPATIENT
Start: 2024-04-26 | End: 2024-04-26 | Stop reason: SURG

## 2024-04-26 RX ADMIN — LIDOCAINE HYDROCHLORIDE 4 ML: 40 SOLUTION TOPICAL at 13:18:00

## 2024-04-26 RX ADMIN — SODIUM CHLORIDE, SODIUM LACTATE, POTASSIUM CHLORIDE, CALCIUM CHLORIDE: 600; 310; 30; 20 INJECTION, SOLUTION INTRAVENOUS at 13:09:00

## 2024-04-26 RX ADMIN — LIDOCAINE HYDROCHLORIDE 100 MG: 10 INJECTION, SOLUTION EPIDURAL; INFILTRATION; INTRACAUDAL; PERINEURAL at 13:17:00

## 2024-04-26 RX ADMIN — EPHEDRINE SULFATE 10 MG: 50 INJECTION, SOLUTION INTRAVENOUS at 14:00:00

## 2024-04-26 RX ADMIN — ROCURONIUM BROMIDE 50 MG: 10 INJECTION, SOLUTION INTRAVENOUS at 13:17:00

## 2024-04-26 RX ADMIN — SODIUM CHLORIDE, SODIUM LACTATE, POTASSIUM CHLORIDE, CALCIUM CHLORIDE: 600; 310; 30; 20 INJECTION, SOLUTION INTRAVENOUS at 15:01:00

## 2024-04-26 RX ADMIN — ONDANSETRON 4 MG: 2 INJECTION INTRAMUSCULAR; INTRAVENOUS at 15:01:00

## 2024-04-26 RX ADMIN — EPHEDRINE SULFATE 5 MG: 50 INJECTION, SOLUTION INTRAVENOUS at 13:56:00

## 2024-04-26 NOTE — PHYSICAL THERAPY NOTE
PHYSICAL THERAPY EVALUATION - INPATIENT     Room Number: 434/434-A  Evaluation Date: 4/26/2024  Type of Evaluation: Initial   Physician Order: PT Eval and Treat    Presenting Problem: s/p Lumbar L4-5, L5-S1 laminectomy with medial facetectomy and discectomy on 4/26     Reason for Therapy: Mobility Dysfunction and Discharge Planning    PHYSICAL THERAPY ASSESSMENT   Patient is a 64 year old male admitted 4/25/2024 for Lumbar L4-5, L5-S1 laminectomy with medial facetectomy and discectomy.  Prior to admission, patient's baseline is independent with ADLs and functional mobility without assistive device.  Patient is currently functioning near baseline with bed mobility, transfers, gait, stair negotiation, standing prolonged periods, and performing household tasks.  Patient is requiring supervision as a result of the following impairments: decreased functional strength, pain, and limited lumbar ROM.  Given patient is at/near baseline, physical therapy will sign off at this time and anticipate no further skilled therapy needs upon discharge home.     PLAN  Patient has been evaluated and presents with no skilled Physical Therapy needs at this time.  Patient will be discharged from Physical Therapy services. Please re-order if a new functional limitation presents during this admission.    PHYSICAL THERAPY MEDICAL/SOCIAL HISTORY     Problem List  Principal Problem:    L4-L5 disc bulge  Active Problems:    Left leg pain    Hyperglycemia    Lumbar radiculopathy      HOME SITUATION  Home Situation  Type of Home: House  Home Layout: Two level;Bed/bath upstairs  Stairs to Bedroom: 12  Railing: Yes  Lives With: Spouse  Drives: Yes  Patient Owned Equipment: None  Patient Regularly Uses: None     Prior Level of Levy: independent     SUBJECTIVE  Agreeable to activity.     PHYSICAL THERAPY EXAMINATION   OBJECTIVE  Precautions: Spine  Fall Risk: Standard fall risk    WEIGHT BEARING RESTRICTION  Weight Bearing Restriction:  None    PAIN ASSESSMENT  Rating: Unable to rate (\"minimal\")  Location: lower back  Management Techniques: Activity promotion;Body mechanics;Repositioning  COGNITION  Overall Cognitive Status:  WFL - within functional limits    RANGE OF MOTION AND STRENGTH ASSESSMENT  Upper extremity ROM and strength are within functional limits.  Lower extremity ROM is within functional limits.  Lower extremity strength is within functional limits.    BALANCE  Static Sitting: Good  Dynamic Sitting: Fair +  Static Standing: Fair  Dynamic Standing: Fair    AM-PAC '6-Clicks' INPATIENT SHORT FORM - BASIC MOBILITY  How much difficulty does the patient currently have...  Patient Difficulty: Turning over in bed (including adjusting bedclothes, sheets and blankets)?: A Little   Patient Difficulty: Sitting down on and standing up from a chair with arms (e.g., wheelchair, bedside commode, etc.): A Little   Patient Difficulty: Moving from lying on back to sitting on the side of the bed?: A Little   How much help from another person does the patient currently need...   Help from Another: Moving to and from a bed to a chair (including a wheelchair)?: A Little   Help from Another: Need to walk in hospital room?: A Little   Help from Another: Climbing 3-5 steps with a railing?: A Little     AM-PAC Score:  Raw Score: 18   Approx Degree of Impairment: 46.58%   Standardized Score (AM-PAC Scale): 43.63   CMS Modifier (G-Code): CK    FUNCTIONAL ABILITY STATUS  Functional Mobility/Gait Assessment  Gait Assistance: Supervision  Distance (ft): 200  Assistive Device: None  Pattern: Within Functional Limits  Stairs: Stairs  How Many Stairs: 12  Device: 1 Rail  Assist: Supervision  Pattern: Ascend and Descend  Ascend and Descend : Reciprocal  Supine to Sit: supervision  Sit to Supine: supervision  Sit to Stand: supervision    Exercise/Education Provided:  Bed mobility  Body mechanics  Energy conservation  Functional activity tolerated  Gait  training  Posture  Transfer training    Additional Information: RN approved PT eval. Pt received resting in bed with wife at bedside. Introduced self and role. Educated on spine precautions and log rolling, benefits of frequent ambulation/mobility, PT plan of care and goals for today. Pt verbalized understanding. Pt with flat affect and minimally verbal throughout session. Pt seems to not be receptive to education. He stated he has had back surgery before and he knows what he is doing. Non-compliant with log rolling. But was able to demonstrate all functional mobility with supervision. No unsteady gait or LOB. Pt returned to room and per discussion with pt and wife, no further questions or concerns about safe mobility and Dc home likely today. Left pt resting in bed with needs within reach, handoff to RN complete.     The patient's Approx Degree of Impairment: 46.58% has been calculated based on documentation in the [facility] '6 clicks' Inpatient Basic Mobility Short Form.  Research supports that patients with this level of impairment may benefit from home with HH PT but anticipate no needs.  Final disposition will be made by interdisciplinary medical team.    Patient End of Session: In bed;Needs met;Call light within reach;RN aware of session/findings;All patient questions and concerns addressed;Ice applied;Family present    Patient was able to achieve the following ...   Patient able to transfer   Safely and with supervision     Patient able to ambulate on level surfaces   Safely and with supervision      Patient Evaluation Complexity Level:  History Low - no personal factors and/or co-morbidities   Examination of body systems Low -  addressing 1-2 elements   Clinical Presentation Low- Stable   Clinical Decision Making  Low Complexity     Therapeutic Activity:  10 minutes

## 2024-04-26 NOTE — PLAN OF CARE
KENNETH OLVERA M.D., [email or web address].[email or web address].     of Neurosurgery  [facility]  Board Certified Neurosurgeon                              [facility]        [place] [facility]      [address]  [address]  [place] [address]    PHONE  [phone]          FAX  [phone]    [email or web address]    [facility]  part of [facility]     LAMINECTOMY DISCLAIMER AND CONSENT        4/26/2024  7:43 AM    PRE-OPERATIVE CONSULTATION                           Maxwell Cuello Sylvia was again informed of the nature of the problem, planned treatment, indications and alternatives.  We again reviewed the expected benefits of surgery, as well as all reasonably foreseeable risks, including but not limited to infection, bleeding requiring transfusion or reoperation, no relief or worsening of the pain, numbness, weakness, need for assistive devices to get around, injury to the nerves, paralysis, loss of control of the legs/bladder/bowel/sexual function, spinal fluid leak, scar formation, abnormal movement of the bones on each other requiring fusion surgery to fix, heart attack, blood clot formation, pulmonary embolism, stroke, coma and death. his questions were all answered and he understands what we discussed.  Maxwell Cuate Ward also understands that no guarantees can be made regarding outcome or results.  he agrees the benefits of surgery outweigh the risks, and wishes to proceed with surgery.    Kenneth Olvera M.D., [email or web address].[email or web address].N.S.

## 2024-04-26 NOTE — PLAN OF CARE
Problem: Patient Centered Care  Goal: Patient preferences are identified and integrated in the patient's plan of care  Description: Interventions:  - What would you like us to know as we care for you? Home with wife   - Provide timely, complete, and accurate information to patient/family  - Incorporate patient and family knowledge, values, beliefs, and cultural backgrounds into the planning and delivery of care  - Encourage patient/family to participate in care and decision-making at the level they choose  - Honor patient and family perspectives and choices  4/26/2024 0215 by Yareli Morse, RN  Outcome: Progressing     Problem: Patient/Family Goals  Goal: Patient/Family Long Term Goal  Description: Patient's Long Term Goal: Return home     Interventions:  - Monitor vital signs  - Monitor appropriate labs  - Administer medications per order  - Pain management as needed  - Follow MD orders  - Update / inform patient and family on plan of care  - Discharge planning  - See additional Care Plan goals for specific interventions  4/26/2024 0215 by Yareli Morse RN  Outcome: Progressing    Goal: Patient/Family Short Term Goal  Description: Patient's Short Term Goal: improve back pain     Interventions:   - Monitor vital signs  - Administer medications per order  - Pain management as needed  - Follow MD orders  - Diagnostics per order  - Update / inform patient and family on plan of care  - Discharge planning  - See additional Care Plan goals for specific interventions  4/26/2024 0215 by Yareli Morse, RN  Outcome: Progressing       Problem: PAIN - ADULT  Goal: Verbalizes/displays adequate comfort level or patient's stated pain goal  Description: INTERVENTIONS:  - Encourage pt to monitor pain and request assistance  - Assess pain using appropriate pain scale  - Administer analgesics based on type and severity of pain and evaluate response  - Implement non-pharmacological measures as appropriate and evaluate  response  - Consider cultural and social influences on pain and pain management  - Manage/alleviate anxiety  - Utilize distraction and/or relaxation techniques  - Monitor for opioid side effects  - Notify MD/LIP if interventions unsuccessful or patient reports new pain  - Anticipate increased pain with activity and pre-medicate as appropriate  4/26/2024 0215 by Yareli Morse RN  Outcome: Progressing  Problem: MUSCULOSKELETAL - ADULT  Goal: Return mobility to safest level of function  Description: INTERVENTIONS:  - Assess patient stability and activity tolerance for standing, transferring and ambulating w/ or w/o assistive devices  - Assist with transfers and ambulation using safe patient handling equipment as needed  - Ensure adequate protection for wounds/incisions during mobilization  - Obtain PT/OT consults as needed  - Advance activity as appropriate  - Communicate ordered activity level and limitations with patient/family  4/26/2024 0215 by Yareli Morse RN  Outcome: Progressing  Goal: Maintain proper alignment of affected body part  Description: INTERVENTIONS:  - Support and protect limb and body alignment per provider's orders  - Instruct and reinforce with patient and family use of appropriate assistive device and precautions (e.g. spinal or hip dislocation precautions)  4/26/2024 0215 by Yareli Morse RN  Outcome: Progressing     Problem: Impaired Functional Mobility  Goal: Achieve highest/safest level of mobility/gait  Description: Interventions:  - Assess patient's functional ability and stability  - Promote increasing activity/tolerance for mobility and gait  - Educate and engage patient/family in tolerated activity level and precautions  - Recommend use of  RW   for transfers and ambulation  4/26/2024 0215 by Yareli Morse RN  Outcome: Progressing      Plan for surgery in AM with - Consent signed and in chart. NPO at midnight. Pain management as needed. Monitoring vital signs- stable  at this time. No acute changes noted at this time. Safety and fall precautions maintained- bed locked in lowest position, call light within reach.

## 2024-04-26 NOTE — PLAN OF CARE
Problem: Patient Centered Care  Goal: Patient preferences are identified and integrated in the patient's plan of care  Description: Interventions:  - What would you like us to know as we care for you? Home with wife   - Provide timely, complete, and accurate information to patient/family  - Incorporate patient and family knowledge, values, beliefs, and cultural backgrounds into the planning and delivery of care  - Encourage patient/family to participate in care and decision-making at the level they choose  - Honor patient and family perspectives and choices  Outcome: Completed     Problem: Patient/Family Goals  Goal: Patient/Family Long Term Goal  Description: Patient's Long Term Goal: Return home     Interventions:  - Monitor vital signs  - Monitor appropriate labs  - Administer medications per order  - Pain management as needed  - Follow MD orders  - Update / inform patient and family on plan of care  - Discharge planning    - See additional Care Plan goals for specific interventions  Outcome: Completed  Goal: Patient/Family Short Term Goal  Description: Patient's Short Term Goal: improve back pain     Interventions:   - Monitor vital signs  - Administer medications per order  - Pain management as needed  - Follow MD orders  - Diagnostics per order  - Update / inform patient and family on plan of care  - Discharge planning    - See additional Care Plan goals for specific interventions  Outcome: Completed     Problem: PAIN - ADULT  Goal: Verbalizes/displays adequate comfort level or patient's stated pain goal  Description: INTERVENTIONS:  - Encourage pt to monitor pain and request assistance  - Assess pain using appropriate pain scale  - Administer analgesics based on type and severity of pain and evaluate response  - Implement non-pharmacological measures as appropriate and evaluate response  - Consider cultural and social influences on pain and pain management  - Manage/alleviate anxiety  - Utilize distraction  and/or relaxation techniques  - Monitor for opioid side effects  - Notify MD/LIP if interventions unsuccessful or patient reports new pain  - Anticipate increased pain with activity and pre-medicate as appropriate  Outcome: Completed     Problem: MUSCULOSKELETAL - ADULT  Goal: Return mobility to safest level of function  Description: INTERVENTIONS:  - Assess patient stability and activity tolerance for standing, transferring and ambulating w/ or w/o assistive devices  - Assist with transfers and ambulation using safe patient handling equipment as needed  - Ensure adequate protection for wounds/incisions during mobilization  - Obtain PT/OT consults as needed  - Advance activity as appropriate  - Communicate ordered activity level and limitations with patient/family  Outcome: Completed  Goal: Maintain proper alignment of affected body part  Description: INTERVENTIONS:  - Support and protect limb and body alignment per provider's orders  - Instruct and reinforce with patient and family use of appropriate assistive device and precautions (e.g. spinal or hip dislocation precautions)  Outcome: Completed     Problem: Impaired Functional Mobility  Goal: Achieve highest/safest level of mobility/gait  Description: Interventions:  - Assess patient's functional ability and stability  - Promote increasing activity/tolerance for mobility and gait  - Educate and engage patient/family in tolerated activity level and precautions  Outcome: Completed

## 2024-04-26 NOTE — ANESTHESIA PROCEDURE NOTES
Peripheral IV  Date/Time: 4/26/2024 1:09 PM  Inserted by: Todd Roth MD    Placement  Needle size: 18 G  Laterality: left  Location: hand  Site prep: alcohol  Technique: anatomical landmarks

## 2024-04-26 NOTE — PLAN OF CARE
Problem: Patient Centered Care  Goal: Patient preferences are identified and integrated in the patient's plan of care  Description: Interventions:  - What would you like us to know as we care for you? Home with wife   - Provide timely, complete, and accurate information to patient/family  - Incorporate patient and family knowledge, values, beliefs, and cultural backgrounds into the planning and delivery of care  - Encourage patient/family to participate in care and decision-making at the level they choose  - Honor patient and family perspectives and choices  4/26/2024 0834 by Flora Dykes RN  Outcome: Progressing  4/26/2024 0834 by Flora Dykes RN  Outcome: Progressing     Problem: Patient/Family Goals  Goal: Patient/Family Long Term Goal  Description: Patient's Long Term Goal: Return home     Interventions:  - Monitor vital signs  - Monitor appropriate labs  - Administer medications per order  - Pain management as needed  - Follow MD orders  - Update / inform patient and family on plan of care  - Discharge planning    - See additional Care Plan goals for specific interventions  4/26/2024 0834 by lFora Dykes RN  Outcome: Progressing  4/26/2024 0834 by Flora Dykes RN  Outcome: Progressing  Goal: Patient/Family Short Term Goal  Description: Patient's Short Term Goal: improve back pain     Interventions:   - Monitor vital signs  - Administer medications per order  - Pain management as needed  - Follow MD orders  - Diagnostics per order  - Update / inform patient and family on plan of care  - Discharge planning    - See additional Care Plan goals for specific interventions  4/26/2024 0834 by Flora Dykes RN  Outcome: Progressing  4/26/2024 0834 by Flora Dykes RN  Outcome: Progressing     Problem: PAIN - ADULT  Goal: Verbalizes/displays adequate comfort level or patient's stated pain goal  Description: INTERVENTIONS:  - Encourage pt to monitor pain and request assistance  - Assess pain  using appropriate pain scale  - Administer analgesics based on type and severity of pain and evaluate response  - Implement non-pharmacological measures as appropriate and evaluate response  - Consider cultural and social influences on pain and pain management  - Manage/alleviate anxiety  - Utilize distraction and/or relaxation techniques  - Monitor for opioid side effects  - Notify MD/LIP if interventions unsuccessful or patient reports new pain  - Anticipate increased pain with activity and pre-medicate as appropriate  4/26/2024 0834 by Flora Dykes RN  Outcome: Progressing  4/26/2024 0834 by Flora Dykes RN  Outcome: Progressing     Problem: MUSCULOSKELETAL - ADULT  Goal: Return mobility to safest level of function  Description: INTERVENTIONS:  - Assess patient stability and activity tolerance for standing, transferring and ambulating w/ or w/o assistive devices  - Assist with transfers and ambulation using safe patient handling equipment as needed  - Ensure adequate protection for wounds/incisions during mobilization  - Obtain PT/OT consults as needed  - Advance activity as appropriate  - Communicate ordered activity level and limitations with patient/family  4/26/2024 0834 by Flora yDkes RN  Outcome: Progressing  4/26/2024 0834 by Flora Dykes RN  Outcome: Progressing  Goal: Maintain proper alignment of affected body part  Description: INTERVENTIONS:  - Support and protect limb and body alignment per provider's orders  - Instruct and reinforce with patient and family use of appropriate assistive device and precautions (e.g. spinal or hip dislocation precautions)  4/26/2024 0834 by Flora Dykes RN  Outcome: Progressing  4/26/2024 0834 by Flora Dykes RN  Outcome: Progressing     Problem: Impaired Functional Mobility  Goal: Achieve highest/safest level of mobility/gait  Description: Interventions:  - Assess patient's functional ability and stability  - Promote increasing  activity/tolerance for mobility and gait  - Educate and engage patient/family in tolerated activity level and precautions  - When transferring patient, block weaker knee for safety  4/26/2024 0834 by Flora Dykes, RN  Outcome: Progressing  4/26/2024 0834 by Flora Dykes, RN  Outcome: Progressing     Patient is presently resting in the bed. Alert x 4. Vital signs taken and stable. Currently NPO for surgical procedure of Left Lumbar 4 - Lumbar 5 - Sacral 1 Microdiscectomy on this morning. Consent has been signed by patient and witnessed by this RN. Pre-op checklist has been completed. Patient is self care and independent. Patient receives intravenous antibiotics per order. Dr. Pang was sent a message by [employer] to inquire on whether patient can receive morning lisinopril with small sip of water due to procedure and being NPO. Awaiting for response back for administration of lisinopril. No complaints of pain or discomfort. Call light within reach at all times. Patient was seen by Dr. Kenneth Liao on this morning.

## 2024-04-26 NOTE — PROGRESS NOTES
Patient cleared by Los PRITCHARD, and Dr Pang for discharge. Patient voiding freely and tolerating oral intake. Passed PT. Denies surgical pain. Medications sent to pharmacy for pickup.

## 2024-04-26 NOTE — ANESTHESIA POSTPROCEDURE EVALUATION
Patient: Maxwell Cameron    Procedure Summary       Date: 04/26/24 Room / Location: [facility] MAIN OR 09 / [facility] MAIN OR    Anesthesia Start: 1309 Anesthesia Stop: 1518    Procedure: Lumbar L4-5, L5-S1 laminectomy with medial facetectomy and discectomy (Spine Lumbar) Diagnosis: (Lumbar spondylolisthsis with radiculopathy)    Surgeons: Kenneth Liao MD Anesthesiologist: Dexter Roth MD    Anesthesia Type: general ASA Status: 3            Anesthesia Type: general    Vitals Value Taken Time   /93 04/26/24 1516   Temp 98.1 °F (36.7 °C) 04/26/24 1518   Pulse 77 04/26/24 1517   Resp 40 04/26/24 1517   SpO2 95 % 04/26/24 1517   Vitals shown include unfiled device data.    [facility] AN Post Evaluation:   Patient Evaluated in PACU  Patient Participation: complete - patient participated  Level of Consciousness: awake and alert  Pain Management: adequate  Airway Patency:patent  Dental exam unchanged from preop  Yes    Nausea/Vomiting: none  Cardiovascular Status: acceptable  Respiratory Status: acceptable  Postoperative Hydration acceptable      DEXTER ROTH MD  4/26/2024 3:18 PM

## 2024-04-26 NOTE — PROGRESS NOTES
[facility]  part of [facility]    Progress Note    Maxwell Cameron Patient Status:  Inpatient    1960 MRN [id number]   Location [facility] 5SW/SE Attending Fredis Pang MD   Hosp Day # 0 PCP CALDERON CORTEZ MD     Chief Complaint:     Intractable back pain    Subjective:   Subjective:    Patient seen and examined this morning  No acute events overnight  Pain is controlled  Afebrile, normotensive.    Objective:   Blood pressure 138/77, pulse 61, temperature 97.7 °F (36.5 °C), temperature source Oral, resp. rate 18, height 6' 1\" (1.854 m), weight 273 lb 11.2 oz (124.1 kg), SpO2 96%.  Physical Exam    General: Patient is alert and oriented x3 does not appear to be in acute distress at this time  HEENT: EOMI PERRLA, atraumatic normocephalic  Cardiac: S1-S2 appreciated  Lungs: Good air entry bilaterally clear to auscultation  Abdomen: Soft nontender nondistended positive bowel sounds  Ext: Peripheral pulses are positive  Neuro: No focal deficits noted  Psych: Normal mood  Skin: No rashes noted  MSK: Full range of motion intact      Results:   Lab Results   Component Value Date    WBC 7.6 2024    HGB 14.8 2024    HCT 45.2 2024    .0 2024    CREATSERUM 0.89 2024    BUN 14 2024     2024    K 3.9 2024     2024    CO2 27.0 2024    GLU 88 2024    CA 9.5 2024    PTT 27.6 2024    INR 0.97 2024    ESRML 22 (H) 2024    CRP 0.50 2024    MG 2.0 2024       MRI SPINE LUMBAR (CPT=72148)    Result Date: 2024  CONCLUSION:   1. Advanced multilevel degenerative changes of the lumbar spine as detailed. Notable levels as follows:  2. L4-L5:  Inferiorly directed 1.7 cm left paracentral/subarticular zone disc extrusion, which results in severe left lateral recess stenosis and effacement of the traversing left L5 nerve root.  Please correlate for left L5 radiculopathy.  Additional  severe multifactorial spinal canal, moderate bilateral neural foraminal, and mild right lateral recess stenosis at this level. 3. L5-S1:  Moderate left lateral recess stenosis, which is related to a left paracentral/subarticular zone disc protrusion, which results in effacement of the traversing left S1 nerve root.  Additional moderate left and mild-to-moderate right neural foraminal as well as minor spinal canal stenosis. 4. L2-L3:  Moderate spinal canal with mild-to-moderate right and mild left neural foraminal stenosis. 5. L3-L4:  Moderate right and mild left neural foraminal with mild spinal canal stenosis.  6. Endplate edematous degenerative changes at L4-L5 and to a lesser degree L2-L3.  7. Mild chronic T12 vertebral body compression deformity.  8. Nonspecific red marrow conversion throughout the cervical spine.  Findings can be seen in the setting of hematopoietic disturbances such as anemia or chronic smoking amongst other etiologies.   elm-remote  Dictated by (CST): Abelardo Downey MD on 4/25/2024 at 7:05 AM     Finalized by (CST): Abelardo Downey MD on 4/25/2024 at 7:13 AM               Assessment & Plan:       Intractable lower back pain.    -MRI results have been reviewed, consistent with disc protrusion.    -At this time we will continue IV analgesics,   -IV antiemetics as needed.  Neurosurgery has been placed on consult.   -We will defer to their recommendations. We will continue to follow the patient closely.     Hypertension.    -If hypertensive, resume patient's oral antihypertensives.    For history of gout, we will resume the patient's gout regimen.    The patient was self-treating himself for diverticulitis that he initially thought it would be due to that.  At this time, we will hold p.o. antibiotics.  We will continue to monitor closely.     Venous thromboembolism prophylaxis.  We will hold pharmacologic VTE prophylaxis in the setting of possible intervention.     Disposition:  At this time we  will monitor closely.  The patient is a Full Code.  Further recommendations to follow.     Global A/P  -started on IV zosyn prophylactically  as patient is on outpatient augmentin for presumed diverticulitis.  -plan for intervention today  -Reviewed previous consultant notes  -Reviewed CBC, BMP, Mag, and Phos  -Reviewed tests ordered  -Repeat labs in am  -MDM: High, severe exacerbation of chronic illness posing a threat to life. IV medications requiring close inpatient monitoring.           Fredis Pang MD

## 2024-04-26 NOTE — ANESTHESIA PROCEDURE NOTES
Airway  Date/Time: 4/26/2024 1:18 PM  Urgency: Elective      General Information and Staff    Patient location during procedure: OR  Anesthesiologist: Todd Roth MD  Performed: anesthesiologist   Performed by: Todd Roth MD  Authorized by: Todd Roth MD      Indications and Patient Condition  Indications for airway management: anesthesia  Sedation level: deep  Preoxygenated: yes  Patient position: sniffing  Mask difficulty assessment: 1 - vent by mask    Final Airway Details  Final airway type: endotracheal airway      Successful airway: ETT  Cuffed: yes   Successful intubation technique: Video laryngoscopy  Endotracheal tube insertion site: oral  Blade size: #4  ETT size (mm): 7.0    Cormack-Lehane Classification: grade I - full view of glottis  Placement verified by: capnometry   Measured from: teeth  ETT to teeth (cm): 22  Number of attempts at approach: 1

## 2024-04-30 NOTE — DISCHARGE SUMMARY
[facility]  part of [facility]     Discharge Summary    Maxwell Marie Patient Status:  Inpatient    1960 MRN [id number]   Location [facility] 4W/SW/SE Attending No att. providers found   Hosp Day # 1 PCP CALDERON CORTEZ MD     Date of Admission: 2024    Date of Discharge: 2024  6:45 PM    Admitting Diagnosis: Lumbar radiculopathy [M54.16]  L4-L5 disc bulge [M51.36]    Discharge Diagnosis:   Patient Active Problem List   Diagnosis    Left leg pain    Hyperglycemia    L4-L5 disc bulge    Lumbar radiculopathy       Reason for Admission:     L4-L5 disc bulge    Physical Exam:     General: Patient is alert and oriented x3 does not appear to be in acute distress at this time  HEENT: EOMI PERRLA, atraumatic normocephalic  Cardiac: S1-S2 appreciated  Lungs: Good air entry bilaterally clear to auscultation  Abdomen: Soft nontender nondistended positive bowel sounds  Ext: Peripheral pulses are positive  Neuro: No focal deficits noted  Psych: Normal mood  Skin: No rashes noted  MSK: Full range of motion intact      Hospital Course:     Intractable lower back pain.    -MRI results have been reviewed, consistent with disc protrusion.    -At this time we will continue IV analgesics,   -IV antiemetics as needed.  Neurosurgery has been placed on consult.   -We will defer to their recommendations. We will continue to follow the patient closely.      Hypertension.    -If hypertensive, resume patient's oral antihypertensives.     For history of gout, we will resume the patient's gout regimen.     The patient was self-treating himself for diverticulitis that he initially thought it would be due to that.  At this time, we will hold p.o. antibiotics.  We will continue to monitor closely.      Venous thromboembolism prophylaxis.  We will hold pharmacologic VTE prophylaxis in the setting of possible intervention.     Disposition:  At this time we will monitor closely.  The patient is a Full Code.   Further recommendations to follow.     Global A/P  -started on IV zosyn prophylactically  as patient is on outpatient augmentin for presumed diverticulitis.  -plan for intervention today  -Reviewed previous consultant notes  -Reviewed CBC, BMP, Mag, and Phos  -Reviewed tests ordered  -Repeat labs in am  -MDM: High, severe exacerbation of chronic illness posing a threat to life. IV medications requiring close inpatient monitoring.     History of Present Illness:     The patient is a 64-year-old male with a past medical history of hypertension who had come to the hospital endorsing left groin as well as left leg pain and numbness. The patient does have a history of hyperlipidemia and thyroid disease and hypertension, as well as diverticulitis. He has been self-treating himself with antibiotics for what he had initially thought to be epididymitis from the groin pain and then augmented for left lower quadrant abdominal pain. Yesterday he got out of his car and then he started having pain down his left leg, and that is what brought him to the emergency department. In the emergency department, the patient underwent an MRI of the lumbar spine and was found to have an L4-L5 disc extrusion compressing and effacing the left L5 peripheral nerve root; no signs of cord compression. Found to have multilevel disc disease. Neurosurgery was placed on consult. The patient was admitted to the hospital and started on IV analgesics, IV antiemetics, and for further monitoring. The patient was seen and examined on the medical floor, still endorsing pain, requesting pain. Denied any chest pain, shortness of breath, palpitations, nausea, or vomiting at this time.       Disposition: Home or Self Care    Discharge Condition: Stable    Discharge Medications:   Discharge Medication List as of 4/26/2024  6:27 PM        START taking these medications    Details   HYDROcodone-acetaminophen  MG Oral Tab Take 1 tablet by mouth every 6 (six)  hours as needed for Pain., Normal, Disp-28 tablet, R-0      methocarbamol 750 MG Oral Tab Take 1 tablet (750 mg total) by mouth 3 (three) times daily for 10 days., Normal, Disp-30 tablet, R-0      methylPREDNISolone (MEDROL) 4 MG Oral Tablet Therapy Pack Dosepack: take as directed, Normal, Disp-1 each, R-0           CONTINUE these medications which have NOT CHANGED    Details   atorvastatin 20 MG Oral Tab Take 1 tablet (20 mg total) by mouth daily., Historical      lisinopril 20 MG Oral Tab Take 1 tablet (20 mg total) by mouth daily., Historical      allopurinol 300 MG Oral Tab Take 1 tablet (300 mg total) by mouth daily., Historical      levothyroxine 50 MCG Oral Tab Take 1 tablet (50 mcg total) by mouth before breakfast., Historical             Total dc time > 30 min    Fredis Pang MD  4/30/2024  4:36 PM     Hospital Discharge Diagnoses:  l4-l5 disc bulge    Lace+ Score: 31  59-90 High Risk  29-58 Medium Risk  0-28   Low Risk.    TCM Follow-Up Recommendation:  LACE < 29: Low Risk of readmission after discharge from the hospital; Still recommend for TCM follow-up.

## 2024-05-02 NOTE — PAYOR COMM NOTE
CASE STARTED OUT OBSERVATION ON 4/25  MADE INPT 4/26    ADMISSION REVIEW     Payor: [employer] OUT OF STATE PPO  Subscriber #:  [id number]  Authorization Number: [id number]    Admit date: 4/26/24  Admit time: 11:37 AM       REVIEW DOCUMENTATION:     ED Provider Notes        ED Provider Notes signed by Julian Hines MD at 4/25/2024  1:02 PM       Author: Julian Hines MD Service: -- Author Type: Physician    Filed: 4/25/2024  1:02 PM Date of Service: 4/25/2024  7:50 AM Status: Signed    : Julian Hines MD (Physician)         MRI scan shows L4-L5 disc extrusion compressing and effacing the left L5 peripheral nerve root.  No signs of cord compression.  Multilevel disc disease.  Discussed results with patient.  Originally requested admission to the hospital for consideration for surgery and discussed with the hospitalist and neurosurgery.      Signed by Julian Hines MD on 4/25/2024  1:02 PM         MEDICATIONS ADMINISTERED IN LAST 1 DAY:  Administration History       None            Vitals (last day) before discharge       Date/Time Temp Pulse Resp BP SpO2 Weight O2 Device O2 Flow Rate (L/min) [facility]    04/26/24 1631 97.7 °F (36.5 °C) 64 16 139/80 98 % -- None (Room air) -- MB    04/26/24 1616 97 °F (36.1 °C) 63 15 140/80 98 % -- Nasal cannula 2 L/min     04/26/24 1606 -- 66 17 138/77 99 % -- Nasal cannula 2 L/min AT    04/26/24 1556 -- 63 13 139/68 98 % -- Nasal cannula 2 L/min AT    04/26/24 1546 -- 68 13 142/96 97 % -- Nasal cannula 2 L/min AT    04/26/24 1536 -- 66 10 141/91 97 % -- Nasal cannula 2 L/min AT    04/26/24 1526 -- 65 18 144/87 96 % -- Nasal cannula 2 L/min AT    04/26/24 1518 98.1 °F (36.7 °C) -- -- -- -- -- -- -- AB    04/26/24 1516 -- 80 24 153/93 95 % -- Nasal cannula 2 L/min DS    04/26/24 1154 -- 57 17 138/71 98 % -- None (Room air) -- AM    04/26/24 1102 97.7 °F (36.5 °C) 61 18 138/77 96 % -- None (Room air) -- CELI    04/26/24 0753 97.8 °F (36.6 °C) 60 18 134/75 97 % -- None  (Room air) -- CELI    04/26/24 0546 97.8 °F (36.6 °C) 65 16 134/82 96 % -- None (Room air) -- BL    04/25/24 2034 98.1 °F (36.7 °C) 87 16 141/78 95 % -- None (Room air) -- BL    04/25/24 1541 97.5 °F (36.4 °C) 84 18 126/71 96 % -- None (Room air) -- CELI    04/25/24 1142 97.5 °F (36.4 °C) 65 18 134/85 93 % -- None (Room air) -- CELI    04/25/24 0849 97.6 °F (36.4 °C) 61 18 158/84 96 % 273 lb 11.2 oz None (Room air) -- WM    04/25/24 0730 -- 66 20 139/69 96 % -- None (Room air) -- SD    04/25/24 0645 -- 63 18 134/75 96 % -- None (Room air) -- SD    04/25/24 0545 -- 61 -- -- 99 % -- -- -- AC    04/25/24 0447 -- -- -- 142/84 -- -- -- -- AC    04/25/24 0353 -- -- -- 139/85 -- -- -- --     04/25/24 0312 97.7 °F (36.5 °C) 64 18 160/81 97 % 270 lb None (Room air) --            4/25 HOSPITALIST NOTE  ADMISSION DATE:       04/25/2024     HISTORY AND PHYSICAL EXAMINATION     HISTORY OF PRESENT ILLNESS:  The patient is a 64-year-old male with a past medical history of hypertension who had come to the hospital endorsing left groin as well as left leg pain and numbness.  The patient does have a history of hyperlipidemia and thyroid disease and hypertension, as well as diverticulitis.  He has been self-treating himself with antibiotics for what he had initially thought to be epididymitis from the groin pain and then augmented for left lower quadrant abdominal pain.  Yesterday he got out of his car and then he started having pain down his left leg, and that is what brought him to the emergency department.  In the emergency department, the patient underwent an MRI of the lumbar spine and was found to have an L4-L5 disc extrusion compressing and effacing the left L5 peripheral nerve root; no signs of cord compression.  Found to have multilevel disc disease.  Neurosurgery was placed on consult.  The patient was admitted to the hospital and started on IV analgesics, IV antiemetics, and for further monitoring.  The patient was seen and  examined on the medical floor, still endorsing pain, requesting pain.  Denied any chest pain, shortness of breath, palpitations, nausea, or vomiting at this time.      ASSESSMENT AND PLAN:  The patient is a 64-year-old male with a past medical history of hypertension, hypothyroidism, history of diverticulitis, who has been admitted to the hospital with intractable lower back pain.    1.       Intractable lower back pain.  MRI results have been reviewed, consistent with disc protrusion.  At this time we will continue IV analgesics, IV antiemetics as needed.  Neurosurgery has been placed on consult. We will defer to their recommendations. We will continue to follow the patient closely.   2.     Hypertension.  If hypertensive, resume patient's oral antihypertensives.  3.       For history of gout, we will resume the patient's gout regimen.  4.       The patient was self-treating himself for diverticulitis that he initially thought it would be due to that.  At this time, we will hold p.o. antibiotics.  We will continue to monitor closely.  4.       Venous thromboembolism prophylaxis.  We will hold pharmacologic VTE prophylaxis in the setting of possible intervention.    5.       Disposition:  At this time we will monitor closely.  The patient is a Full Code.  Further recommendations to follow.    4/25 NEUROSURGERY CONSULT NOTE  REASON FOR CONSULTATION: Lumbar radiculopathy, lumbar disc herniation         HISTORY OF PRESENT ILLNESS:  Maxwell Cameron is a(n) 64 year old male with a pertinent PMH of HTN and left L4-5 microdiscectomy in 2010 at [facility].  The patient endorses no cardiac history or history of MI.  No history of stroke.  Endorses no daily use of antiplatelet/anticoagulation therapy.     Who presents to the ED endorsing left lower extremity pain, numbness and tingling.  MRI lumbar spine with inferiorly directed 1.7 cm left paracentral/subarticular zone disc extrusion, which results in severe left lateral  recess stenosis and effacement of the traversing left L5 nerve root.  Please correlate for left L5 radiculopathy.  Additional severe multifactorial spinal canal, moderate bilateral neuroforaminal, and mild right lateral recess stenosis at this level. Patient with multi level lumbar DDD as noted per report, please see for further details.      Onset yesterday morning.  No inciting event.  The pain is located in the left glute with radiation down the outer and posterior aspect of the thigh and calf to the bottom of the foot.  Tingling at the bottom of the foot.  Endorses numbness as well.  No right lower extremity pain, numbness, tingling or weakness.  No low back pain, he states is more left glute pain.  No gait instability.  No neck pain or radiating upper extremity pain.  No hand numbness, tingling or weakness.  No bladder/bowel incontinence/retention.         ASSESSMENT:      Patient Active Problem List   Diagnosis    Left leg pain    Hyperglycemia    L4-L5 disc bulge    Lumbar radiculopathy      65 y/o male who presents to the ED with left LE pain, numbness and tingling, MRI with a large inferiorly directed L4-5 disc herniation with spinal stenosis. Plan to proceed with surgical intervention tomorrow with Dr. Liao  History of L4-5 left microdiscectomy in 2010 at [facility]        Plan:  Plan for neurosurgical intervention with Dr. Liao tomorrow  N.p.o. at midnight  Hold anticoagulation/antiplatelet therapy  PT/OT if patient able to tolerate  Ambulate 4 times daily as tolerated  Out of bed to chair for all meals as tolerated  Medical management per hospitalist  Pain management as ordered  Discussed with Dr. Liao     4/26 HOSPITALIST NOTE    Chief Complaint:      Intractable back pain        Subjective:  Subjective:     Patient seen and examined this morning  No acute events overnight  Pain is controlled  Afebrile, normotensive.           Objective:  Blood pressure 138/77, pulse 61, temperature 97.7 °F (36.5  °C), temperature source Oral, resp. rate 18, height 6' 1\" (1.854 m), weight 273 lb 11.2 oz (124.1 kg), SpO2 96%.         Results:        Lab Results   Component Value Date     WBC 7.6 04/26/2024     HGB 14.8 04/26/2024     HCT 45.2 04/26/2024     .0 04/26/2024     CREATSERUM 0.89 04/26/2024     BUN 14 04/26/2024      04/26/2024     K 3.9 04/26/2024      04/26/2024     CO2 27.0 04/26/2024     GLU 88 04/26/2024     CA 9.5 04/26/2024     PTT 27.6 04/25/2024     INR 0.97 04/25/2024     ESRML 22 (H) 04/25/2024     CRP 0.50 04/25/2024     MG 2.0 04/26/2024          Assessment & Plan:  Intractable lower back pain.    -MRI results have been reviewed, consistent with disc protrusion.    -At this time we will continue IV analgesics,   -IV antiemetics as needed.  Neurosurgery has been placed on consult.   -We will defer to their recommendations. We will continue to follow the patient closely.      Hypertension.    -If hypertensive, resume patient's oral antihypertensives.     For history of gout, we will resume the patient's gout regimen.     The patient was self-treating himself for diverticulitis that he initially thought it would be due to that.  At this time, we will hold p.o. antibiotics.  We will continue to monitor closely.      Venous thromboembolism prophylaxis.  We will hold pharmacologic VTE prophylaxis in the setting of possible intervention.     Disposition:  At this time we will monitor closely.  The patient is a Full Code.  Further recommendations to follow.     Global A/P  -started on IV zosyn prophylactically  as patient is on outpatient augmentin for presumed diverticulitis.  -plan for intervention today  -Reviewed previous consultant notes  -Reviewed CBC, BMP, Mag, and Phos  -Reviewed tests ordered  -Repeat labs in am    4/26 Date of Surgery: 4/26/2024     Procedure(s):  Lumbar L4-5 laminectomy with medial facetectomy and discectomy  Indication: Lumbar spondylolisthsis with radiculopathy      --------------  DISCHARGE REVIEW    Payor: [employer] OUT [employer] STATE PPO  Subscriber #:  [id number]  Authorization Number: [id number]    Admit date: 24  Admit time:  11:37 AM  Discharge Date: 2024  6:45 PM     Admitting Physician: Fredis Pang MD  Attending Physician:  No att. providers found  Primary Care Physician: Sukhwinder Cortez Md          Discharge Summary Notes        Discharge Summary signed by Fredis Pang MD at 2024  4:38 PM       Author: Fredis Pang MD Specialty: HOSPITALIST, Internal Medicine Author Type: Physician    Filed: 2024  4:38 PM Date of Service: 2024  4:36 PM Status: Signed    : Fredis Pang MD (Physician)         [facility]  part of [facility]     Discharge Summary    Maxwell Marie Patient Status:  Inpatient    1960 MRN [id number]   Location [facility] 4W/SW/SE Attending No att. providers found   Hosp Day # 1 PCP SUKHWINDER CORTEZ MD     Date of Admission: 2024    Date of Discharge: 2024  6:45 PM    Admitting Diagnosis: Lumbar radiculopathy [M54.16]  L4-L5 disc bulge [M51.36]    Discharge Diagnosis:   Patient Active Problem List   Diagnosis    Left leg pain    Hyperglycemia    L4-L5 disc bulge    Lumbar radiculopathy       Reason for Admission:     L4-L5 disc bulge    Physical Exam:     General: Patient is alert and oriented x3 does not appear to be in acute distress at this time  HEENT: EOMI PERRLA, atraumatic normocephalic  Cardiac: S1-S2 appreciated  Lungs: Good air entry bilaterally clear to auscultation  Abdomen: Soft nontender nondistended positive bowel sounds  Ext: Peripheral pulses are positive  Neuro: No focal deficits noted  Psych: Normal mood  Skin: No rashes noted  MSK: Full range of motion intact      Hospital Course:     Intractable lower back pain.    -MRI results have been reviewed, consistent with disc protrusion.    -At this time we will continue IV analgesics,   -IV antiemetics as  needed.  Neurosurgery has been placed on consult.   -We will defer to their recommendations. We will continue to follow the patient closely.      Hypertension.    -If hypertensive, resume patient's oral antihypertensives.     For history of gout, we will resume the patient's gout regimen.     The patient was self-treating himself for diverticulitis that he initially thought it would be due to that.  At this time, we will hold p.o. antibiotics.  We will continue to monitor closely.      Venous thromboembolism prophylaxis.  We will hold pharmacologic VTE prophylaxis in the setting of possible intervention.     Disposition:  At this time we will monitor closely.  The patient is a Full Code.  Further recommendations to follow.     Global A/P  -started on IV zosyn prophylactically  as patient is on outpatient augmentin for presumed diverticulitis.  -plan for intervention today  -Reviewed previous consultant notes  -Reviewed CBC, BMP, Mag, and Phos  -Reviewed tests ordered  -Repeat labs in am  -MDM: High, severe exacerbation of chronic illness posing a threat to life. IV medications requiring close inpatient monitoring.     History of Present Illness:     The patient is a 64-year-old male with a past medical history of hypertension who had come to the hospital endorsing left groin as well as left leg pain and numbness. The patient does have a history of hyperlipidemia and thyroid disease and hypertension, as well as diverticulitis. He has been self-treating himself with antibiotics for what he had initially thought to be epididymitis from the groin pain and then augmented for left lower quadrant abdominal pain. Yesterday he got out of his car and then he started having pain down his left leg, and that is what brought him to the emergency department. In the emergency department, the patient underwent an MRI of the lumbar spine and was found to have an L4-L5 disc extrusion compressing and effacing the left L5 peripheral  nerve root; no signs of cord compression. Found to have multilevel disc disease. Neurosurgery was placed on consult. The patient was admitted to the hospital and started on IV analgesics, IV antiemetics, and for further monitoring. The patient was seen and examined on the medical floor, still endorsing pain, requesting pain. Denied any chest pain, shortness of breath, palpitations, nausea, or vomiting at this time.       Disposition: Home or Self Care    Discharge Condition: Stable    Discharge Medications:   Discharge Medication List as of 4/26/2024  6:27 PM        START taking these medications    Details   HYDROcodone-acetaminophen  MG Oral Tab Take 1 tablet by mouth every 6 (six) hours as needed for Pain., Normal, Disp-28 tablet, R-0      methocarbamol 750 MG Oral Tab Take 1 tablet (750 mg total) by mouth 3 (three) times daily for 10 days., Normal, Disp-30 tablet, R-0      methylPREDNISolone (MEDROL) 4 MG Oral Tablet Therapy Pack Dosepack: take as directed, Normal, Disp-1 each, R-0           CONTINUE these medications which have NOT CHANGED    Details   atorvastatin 20 MG Oral Tab Take 1 tablet (20 mg total) by mouth daily., Historical      lisinopril 20 MG Oral Tab Take 1 tablet (20 mg total) by mouth daily., Historical      allopurinol 300 MG Oral Tab Take 1 tablet (300 mg total) by mouth daily., Historical      levothyroxine 50 MCG Oral Tab Take 1 tablet (50 mcg total) by mouth before breakfast., Historical             Total dc time > 30 min    Fredis Pang MD  4/30/2024  4:36 PM     Hospital Discharge Diagnoses:  l4-l5 disc bulge    Lace+ Score: 31  59-90 High Risk  29-58 Medium Risk  0-28   Low Risk.    TCM Follow-Up Recommendation:  LACE < 29: Low Risk of readmission after discharge from the hospital; Still recommend for TCM follow-up.       Electronically signed by Fredis Pang MD on 4/30/2024  4:38 PM         REVIEWER COMMENTS

## 2024-05-06 NOTE — OPERATIVE REPORT
KENNETH OLVERA M.D., F.A.A.N.S.     of Neurosurgery  [facility]  Board Certified Neurosurgeon                          [facility]  part of [address]  [address] [place], [place] [id number]    PHONE  [phone]          FAX  [phone]    [email or web address]      OPERATIVE REPORT    PATIENT NAME: Maxwell Marie    DATE OF OPERATION: 5/6/2024     PREOPERATIVE DIAGNOSIS:  1. Lumbar spondylosis with radiculopathy             2. Lumbosacral spondylosis with radiculopathy    POSTOPERATIVE DIAGNOSIS: 1. same               2. same    OPERATIVE PROCEDURE:    1.  Lumbar 4 through 5 and L5-S1 laminectomy and 4 through 5 and L5-S1 left medial facetectomy, foraminotomy and microdiscectomy.   2.  Application of 40 mg of epidural DepoMedrol at each lumbar 4 through S1.   3.  Use of intraoperative microscope and microsurgical technique.      CPT CODES: 08822, 19320, 62323-51x2, 21584-67, 87867-60    SURGEON:  Kenneth Olvera M.D.    ASSISTANT: Al Nichols PA-C    ANESTHESIA: General endotracheal anesthesia with TIVA and local anesthetic.    ESTIMATED BLOOD LOSS: 10 cc    INTRAVENOUS FLUIDS AND URINE OUTPUT: Per anesthesia sheet    TRANSFUSION: None    SPECIMEN: None    COMPLICATIONS: none    PROCEDURE:    After informed consent was obtained, the patient was brought to the operating room.  After the uneventful induction of general endotracheal anesthesia, the patient was then positioned on the operating room table, a general OR table with a Perfecto Frame, in the prone position. The arms were supported and the neck physiologically extended. All pressure points were adequately padded.  The patient's eyes were protected from exposure to prolonged pressure.     Subsequently, we utilized lateral and AP fluoroscopic guidance to identify our posterior incision site  SPD gave me [phone] to call because the number on the trip sheet is only for psych pts.    relative to the lumbar bony anatomy corresponding to the correct level(s). It was marked out on the skin.  The patient was prepped and draped sterilely.  The C-arm was also draped sterilely into the field.  Perioperative antibiosis and steroids were administered within 1 hour of incision.  We then infiltrated the incision with our usual local anesthetic. We incised utilizing a 10-blade scalpel. The subcutaneous tissues were opened with a Bovie down to the fascia.  The fascia was opened sharply using both sharp and blunt dissection.  We then used sequential dilators to place a tubular retractor at the L4-5 level along the left laminofacet junction. This was done with fluoroscopic guidance. The tubular retractor was then connected to a table-mounted arm for added stability. At this point in time, the microscope entered the surgical theater and the rest of the procedure was completed, utilizing microsurgical technique.    The soft tissues overlying the laminofacet junction were resected utilizing mono- and bipolar electrocautery. We then used a high-speed jigar the create an ipsilateral laminectomy, undercutting the lamina contralaterally in order to accomplish a generous central decompression. The medial ispilateral facet was then drilled in order to decompress the lateral recess. The residual decompression was accomplished with bayonetted Kerrison rongeurs, removing any residual lamina as well as medial facet.  The hypertrophied ligament flavum was divided with a small staight curette and dissected off the Dura Mater. A Leksell rongeur was used to remove the ligament and complete the decompression. The en-passage ipsilateral nerve root was identified. We gently retracted the nerve medially with the suction-nerve root retractor and performed an annulotomy with a disc knife. We then completed a thorough discectomy utilizing a down-pushing curette, micro-pituitaries and a ball-tip hook. We continued the discectomy until,  overall, the Dura was pulsating in a relaxed fashion, indicating adequate decompression. At this point in time, we irrigated vigorously and obtained hemostasis meticulously with bipolar electrocautery and FlowSeal. 40 mg of DepoMedrol were diluted in 2 ml of sterile injectable saline and placed in a syringe connected to a small angio-catheter. We applied the DepoMedrol with microscopic guidance through the same incision onto the inflamed Dura to provide more immediate neurological improvement. The tubular retractor was then slowly removed, verifying hemostasis at every point of the way meticulously with bipolar electrocautery. The microscope exited the surgical theater at this point.      We then performed the same sequence of events between L5 and S1 with a generous decompression, utilizing the microscope and microsurgical technique, as well as application of another 40 mg of epidural DepoMedrol.    The deep fascia was then closed with interrupted 0 Vicryl suture. The subcutaneous tissues were copiously irrigated and closed with an interrupted inverted 3-0 Vicryl suture. The skin was closed with 4-0 Vicryl and Dermabond.     There were no complications.  All counts were reported correct. The patient was returned to the supine position, extubated in the operating room, and taken to the recovery room in satisfactory condition, having tolerated the procedure well and moving all fours strongly to command.          Kenneth Liao M.D., F.A.A.N.S.    5/6/2024  1:08 PM

## 2024-05-09 ENCOUNTER — OFFICE VISIT (OUTPATIENT)
Facility: CLINIC | Age: 64
End: 2024-05-09
Payer: COMMERCIAL

## 2024-05-09 VITALS
BODY MASS INDEX: 34.65 KG/M2 | DIASTOLIC BLOOD PRESSURE: 83 MMHG | WEIGHT: 270 LBS | HEIGHT: 74 IN | SYSTOLIC BLOOD PRESSURE: 157 MMHG

## 2024-05-09 DIAGNOSIS — Z98.890 S/P LUMBAR LAMINECTOMY: Primary | ICD-10-CM

## 2024-05-09 PROCEDURE — 3079F DIAST BP 80-89 MM HG: CPT | Performed by: NEUROLOGICAL SURGERY

## 2024-05-09 PROCEDURE — 99024 POSTOP FOLLOW-UP VISIT: CPT | Performed by: NEUROLOGICAL SURGERY

## 2024-05-09 PROCEDURE — 3008F BODY MASS INDEX DOCD: CPT | Performed by: NEUROLOGICAL SURGERY

## 2024-05-09 PROCEDURE — 3077F SYST BP >= 140 MM HG: CPT | Performed by: NEUROLOGICAL SURGERY

## 2024-05-09 RX ORDER — DOXYCYCLINE HYCLATE 100 MG/1
CAPSULE ORAL
COMMUNITY
Start: 2024-03-25

## 2024-05-09 RX ORDER — OMEPRAZOLE 20 MG/1
20 CAPSULE, DELAYED RELEASE ORAL DAILY
COMMUNITY

## 2024-05-09 RX ORDER — MULTIVIT WITH MINERALS/LUTEIN
1000 TABLET ORAL DAILY
COMMUNITY

## 2024-05-09 NOTE — PROGRESS NOTES
ALMAZ OLVERA M.D., F.A.A.N.S.     of Neurosurgery  [facility]  Board Certified Neurosurgeon                          Formerly [facility], [facility], [facility]        [address]  [address] [place] [id number]    PHONE  [phone]          FAX  [phone]    [email or web address]      OFFICE FOLLOW-UP NOTE      Maxwell Marie    : 1960    MRN: [id number]  CSN: [id number]      PCP: CALDERON CORTEZ MD  Referring Provider: No ref. provider found    Insurance: Payor: CASPER JOHN PPO / Plan: BLUE CROSS OUT OF STATE / Product Type: PPO /     Date of Visit: 2024    Reason for Visit:   Chief Complaint    Post-Op                         History of Present Care:  Maxwell Marie is a a(n) 64 year old, male.  He is 2 weeks status post L4-5 and L5-S1 decompression microdiscectomy.  He is significantly improved.  He reports some ongoing discomfort in the left lateral leg but overall is quite happy with the outcome.  There are no incisional issues.      History:  .  Past Medical History:    Essential hypertension    Gout    Hyperlipidemia    Thyroid disease      Past Surgical History:   Procedure Laterality Date    Excis lumbar disk,one level        No family history on file.   Social History     Socioeconomic History    Marital status:      Spouse name: Not on file    Number of children: Not on file    Years of education: Not on file    Highest education level: Not on file   Occupational History    Not on file   Tobacco Use    Smoking status: Never    Smokeless tobacco: Never   Substance and Sexual Activity    Alcohol use: Not Currently    Drug use: Never    Sexual activity: Not on file   Other Topics Concern    Not on file   Social History Narrative    Not on file     Social Determinants of Health     Financial Resource Strain: Low Risk  (3/26/2024)    Received from  [facility]    Overall Financial Resource Strain (CARDIA)     Difficulty of Paying Living Expenses: Not hard at all   Food Insecurity: No Food Insecurity (4/25/2024)    Food Insecurity     Food Insecurity: Never true   Transportation Needs: No Transportation Needs (4/25/2024)    Transportation Needs     Lack of Transportation: No   Physical Activity: Not on file   Stress: Not on file   Social Connections: Not on file   Housing Stability: Low Risk  (4/25/2024)    Housing Stability     Housing Instability: No     Housing Instability Emergency: Not on file     Crib or Bassinette: Not on file        Allergies:  No Known Allergies      Medications:  Current Outpatient Medications   Medication Sig Dispense Refill    doxycycline 100 MG Oral Cap       ascorbic acid 250 MG Oral Tab Take 2 tablets (500 mg total) by mouth daily.      Ascorbic Acid (VITAMIN C) 1000 MG Oral Tab Take 1 tablet (1,000 mg total) by mouth daily.      omeprazole 20 MG Oral Capsule Delayed Release Take 1 capsule (20 mg total) by mouth daily.      methylPREDNISolone (MEDROL) 4 MG Oral Tablet Therapy Pack Dosepack: take as directed 1 each 0    atorvastatin 20 MG Oral Tab Take 1 tablet (20 mg total) by mouth daily.      lisinopril 20 MG Oral Tab Take 1 tablet (20 mg total) by mouth daily.      allopurinol 300 MG Oral Tab Take 1 tablet (300 mg total) by mouth daily.      levothyroxine 50 MCG Oral Tab Take 1 tablet (50 mcg total) by mouth before breakfast.          Review of Systems:  A 10-point system was reviewed.  Pertinent positives and negatives are noted in HPI.      Physical Exam:  /83 (BP Location: Left arm, Patient Position: Sitting, Cuff Size: adult)   Ht 74\"   Wt 270 lb (122.5 kg)   BMI 34.67 kg/m²         Neurological Exam:    AAOx3, following commands  PERRLA  EOMI  Face symmetrical  Tongue midline  Hearing symmetrical and intact to finger rub    No rhinorrhea or otorrhea    Romberg negative    Motor Strength:  He  can walk on his heels and he can walk on his toes bilaterally.    Sensation to light touch:  Symmetrical    Incision:  Clean dry and intact    Abdomen:  Soft, non-distended, non-tender, with no rebound or guarding.  No peritoneal signs.     Extremities:  Non-tender, no lower extremity edema noted.      Labs:  CBC:  Lab Results   Component Value Date    WBC 7.6 04/26/2024    HGB 14.8 04/26/2024    HCT 45.2 04/26/2024    MCV 85.9 04/26/2024    .0 04/26/2024      BMG:  Lab Results   Component Value Date     04/26/2024    K 3.9 04/26/2024    CO2 27.0 04/26/2024     04/26/2024    BUN 14 04/26/2024      INR:  Lab Results   Component Value Date    INR 0.97 04/25/2024          Diagnostics:  No new images to review    Diagnosis:  There are no diagnoses linked to this encounter.    Assessment/Plan:  Our patient is doing quite well at this point in time.  He is encouraged to start physical therapy and we will provide him with a prescription and referral to do so.  I would like for him to follow-up at the 6-week postop amy.    More than 30 minutes were spent during this visit and the coordination of this patient's care. All questions and concerns were addressed. We appreciate the opportunity to participate in the care of this patient. Please do not hesitate to call our office ([phone]) with any issues.        Kenneth Liao M.D., F.A.A.N.S.    5/9/2024  10:33 AM    This note has been dictated utilizing voice recognition software. Unfortunately, this may lead to occasional typographical errors. If there are any questions regarding this, please do not hesitate to contact our office.

## 2024-05-09 NOTE — PATIENT INSTRUCTIONS
Refill policies:    Allow 2-3 business days for refills; controlled substances may take longer.  Contact your pharmacy at least 5 days prior to running out of medication and have them send an electronic request or submit request through the “request refill” option in your [facility] account.  Refills are not addressed on weekends; covering physicians do not authorize routine medications on weekends.  No narcotics or controlled substances are refilled after noon on Fridays or by on call physicians.  By law, narcotics must be electronically prescribed.  A 30 day supply with no refills is the maximum allowed.  If your prescription is due for a refill, you may be due for a follow up appointment.  To best provide you care, patients receiving routine medications need to be seen at least once a year.  Patients receiving narcotic/controlled substance medications need to be seen at least once every 3 months.  In the event that your preferred pharmacy does not have the requested medication in stock (e.g. Backordered), it is your responsibility to find another pharmacy that has the requested medication available.  We will gladly send a new prescription to that pharmacy at your request.    Scheduling Tests:    If your physician has ordered radiology tests such as MRI or CT scans, please contact Central Scheduling at [phone] right away to schedule the test.  Once scheduled, the [facility] Centralized Referral Team will work with your insurance carrier to obtain pre-certification or prior authorization.  Depending on your insurance carrier, approval may take 3-10 days.  It is highly recommended patients assure they have received an authorization before having a test performed.  If test is done without insurance authorization, patient may be responsible for the entire amount billed.      Precertification and Prior Authorizations:  If your physician has recommended that you have a procedure or additional testing performed the [facility]  Centralized Referral Team will contact your insurance carrier to obtain pre-certification or prior authorization.    You are strongly encouraged to contact your insurance carrier to verify that your procedure/test has been approved and is a COVERED benefit.  Although the [facility] Centralized Referral Team does its due diligence, the insurance carrier gives the disclaimer that \"Although the procedure is authorized, this does not guarantee payment.\"    Ultimately the patient is responsible for payment.   Thank you for your understanding in this matter.  Paperwork Completion:  If you require FMLA or disability paperwork for your recovery, please make sure to either drop it off or have it faxed to our office at [phone]. Be sure the form has your name and date of birth on it.  The form will be faxed to our Forms Department and they will complete it for you.  There is a 25$ fee for all forms that need to be filled out.  Please be aware there is a 10-14 day turnaround time.  You will need to sign a release of information (AVILA) form if your paperwork does not come with one.  You may call the Forms Department with any questions at [phone].  Their fax number is [phone].       To schedule Physical Therapy at any of the [facility], please call   [phone].     [facility] in [facility]  [address] [place] [address]            [facility] in [place]  [address] [place]  [address]    [facility] in [place]  [address] [place] [address]              [facility] in [place]  [address] [place] [address]    [facility] [place]  [address] [place] [address]            **If you would prefer to do physical therapy at a different facility ([facility], [facility], etc.), please request an \"External\" physical therapy order from your provider      Follow up with Dr. Liao in 4 weeks.

## 2024-06-17 NOTE — PATIENT INSTRUCTIONS
Refill policies:    Allow 2-3 business days for refills; controlled substances may take longer.  Contact your pharmacy at least 5 days prior to running out of medication and have them send an electronic request or submit request through the “request refill” option in your [facility] account.  Refills are not addressed on weekends; covering physicians do not authorize routine medications on weekends.  No narcotics or controlled substances are refilled after noon on Fridays or by on call physicians.  By law, narcotics must be electronically prescribed.  A 30 day supply with no refills is the maximum allowed.  If your prescription is due for a refill, you may be due for a follow up appointment.  To best provide you care, patients receiving routine medications need to be seen at least once a year.  Patients receiving narcotic/controlled substance medications need to be seen at least once every 3 months.  In the event that your preferred pharmacy does not have the requested medication in stock (e.g. Backordered), it is your responsibility to find another pharmacy that has the requested medication available.  We will gladly send a new prescription to that pharmacy at your request.    Scheduling Tests:    If your physician has ordered radiology tests such as MRI or CT scans, please contact Central Scheduling at [phone] right away to schedule the test.  Once scheduled, the [facility] Centralized Referral Team will work with your insurance carrier to obtain pre-certification or prior authorization.  Depending on your insurance carrier, approval may take 3-10 days.  It is highly recommended patients assure they have received an authorization before having a test performed.  If test is done without insurance authorization, patient may be responsible for the entire amount billed.      Precertification and Prior Authorizations:  If your physician has recommended that you have a procedure or additional testing performed the [facility]  Centralized Referral Team will contact your insurance carrier to obtain pre-certification or prior authorization.    You are strongly encouraged to contact your insurance carrier to verify that your procedure/test has been approved and is a COVERED benefit.  Although the [facility] Centralized Referral Team does its due diligence, the insurance carrier gives the disclaimer that \"Although the procedure is authorized, this does not guarantee payment.\"    Ultimately the patient is responsible for payment.   Thank you for your understanding in this matter.  Paperwork Completion:  If you require FMLA or disability paperwork for your recovery, please make sure to either drop it off or have it faxed to our office at [phone]. Be sure the form has your name and date of birth on it.  The form will be faxed to our Forms Department and they will complete it for you.  There is a 25$ fee for all forms that need to be filled out.  Please be aware there is a 10-14 day turnaround time.  You will need to sign a release of information (AVILA) form if your paperwork does not come with one.  You may call the Forms Department with any questions at [phone].  Their fax number is [phone].

## 2024-06-18 ENCOUNTER — OFFICE VISIT (OUTPATIENT)
Dept: SURGERY | Facility: CLINIC | Age: 64
End: 2024-06-18

## 2024-06-18 VITALS
HEART RATE: 62 BPM | DIASTOLIC BLOOD PRESSURE: 82 MMHG | BODY MASS INDEX: 34.65 KG/M2 | WEIGHT: 270 LBS | SYSTOLIC BLOOD PRESSURE: 161 MMHG | HEIGHT: 74 IN

## 2024-06-18 DIAGNOSIS — Z98.890 S/P LUMBAR LAMINECTOMY: Primary | ICD-10-CM

## 2024-06-18 PROCEDURE — 99024 POSTOP FOLLOW-UP VISIT: CPT

## 2024-06-18 PROCEDURE — 3008F BODY MASS INDEX DOCD: CPT

## 2024-06-18 PROCEDURE — 3079F DIAST BP 80-89 MM HG: CPT

## 2024-06-18 PROCEDURE — 3077F SYST BP >= 140 MM HG: CPT

## 2024-06-18 NOTE — PROGRESS NOTES
[facility]        [facility]      [address]  [address]  [place] [address]    PHONE  [phone]          FAX  [phone]    [email or web address]      OFFICE FOLLOW-UP NOTE            Maxwell Marie    : 1960    MRN: [id number]  CSN: [id number]      PCP: CALDERON CORTEZ MD  Referring Provider: No ref. provider found    Insurance: Payor: BCHARISH JOHN PPO / Plan: BLUE CROSS OUT OF STATE / Product Type: PPO /           Date of Visit: 2024    Reason for Visit:   Chief Complaint    Post-Op                         History of Present Care:  Maxwell Marie is a a(n) 64 year old, male returns my office 6 weeks status post left L4-5, L5-S1 laminectomy and microdiscectomy.  Patient states he has made a large improvement in the past 7 days.  He no longer has radiating lower extremity pain through the buttocks and into the foot.  He does continue to have some weakness in the dorsiflexor and EHL on the left lower extremity.  He denies bowel or bladder changes or incisional site issues.      History:  Past Medical History:    Essential hypertension    Gout    Hyperlipidemia    Thyroid disease      Past Surgical History:   Procedure Laterality Date    Excis lumbar disk,one level        No family history on file.   Social History     Socioeconomic History    Marital status:      Spouse name: Not on file    Number of children: Not on file    Years of education: Not on file    Highest education level: Not on file   Occupational History    Not on file   Tobacco Use    Smoking status: Never    Smokeless tobacco: Never   Substance and Sexual Activity    Alcohol use: Not Currently    Drug use: Never    Sexual activity: Not on file   Other Topics Concern    Not on file   Social History Narrative    Not on file     Social Determinants of Health     Financial Resource Strain: Low Risk  (3/26/2024)    Received from [facility]  [facility]    Overall Financial Resource Strain (CARDIA)     Difficulty of Paying Living Expenses: Not hard at all   Food Insecurity: No Food Insecurity (4/25/2024)    Food Insecurity     Food Insecurity: Never true   Transportation Needs: No Transportation Needs (4/25/2024)    Transportation Needs     Lack of Transportation: No   Physical Activity: Not on file   Stress: Not on file   Social Connections: Not on file   Housing Stability: Low Risk  (4/25/2024)    Housing Stability     Housing Instability: No     Housing Instability Emergency: Not on file     Crib or Bassinette: Not on file        Allergies:  No Known Allergies      Medications:  Current Outpatient Medications   Medication Sig Dispense Refill    ascorbic acid 250 MG Oral Tab Take 2 tablets (500 mg total) by mouth daily.      omeprazole 20 MG Oral Capsule Delayed Release Take 1 capsule (20 mg total) by mouth daily.      atorvastatin 20 MG Oral Tab Take 1 tablet (20 mg total) by mouth daily.      lisinopril 20 MG Oral Tab Take 1 tablet (20 mg total) by mouth daily.      allopurinol 300 MG Oral Tab Take 1 tablet (300 mg total) by mouth daily.      levothyroxine 50 MCG Oral Tab Take 1 tablet (50 mcg total) by mouth before breakfast.          Review of Systems:  A 10-point system was reviewed.  Pertinent positives and negatives are noted in HPI.      Physical Exam:  BP (!) 161/82 (BP Location: Left arm, Patient Position: Sitting, Cuff Size: adult)   Pulse 62   Ht 74\"   Wt 270 lb (122.5 kg)   BMI 34.67 kg/m²         Neurological Exam:    AAOx3, following commands  PERRLA  EOMI  Face symmetrical  Tongue midline  Hearing symmetrical and intact to finger rub    No rhinorrhea or otorrhea    Romberg negative    Motor Strength:  Left lower extremity dorsiflexion 4 out of 5  Left lower extremity EHL 4 out of 5  Otherwise symmetrical in bilateral lower extremities    Sensation to light touch:  Intact in bilateral lower extremities  throughout    Incision:  Clean, dry, intact      Abdomen:  Soft, non-distended, non-tender, with no rebound or guarding.  No peritoneal signs.     Extremities:  Non-tender, no lower extremity edema noted.      Labs:  CBC:  Lab Results   Component Value Date    WBC 7.6 04/26/2024    HGB 14.8 04/26/2024    HCT 45.2 04/26/2024    MCV 85.9 04/26/2024    .0 04/26/2024      BMG:  Lab Results   Component Value Date     04/26/2024    K 3.9 04/26/2024    CO2 27.0 04/26/2024     04/26/2024    BUN 14 04/26/2024      INR:  Lab Results   Component Value Date    INR 0.97 04/25/2024          Diagnostics:  No new imaging to review    Diagnosis:  There are no diagnoses linked to this encounter.    Assessment/Plan:  Maxwell Marie returns to the office 6 weeks status post L4-5, L5-S1 laminectomy and microdiscectomy.  Patient is making a great recovery and is quite happy with his outcome thus far.  I have advised him to continue working diligently on his left lower extremity mild foot drop.  I have advised patient he may return to us on an as-needed basis if he has questions or concerns moving forward.  Otherwise he will continue work with physical therapy and will return as needed.      More than 30 minutes were spent during this visit and the coordination of this patient's care. All questions and concerns were addressed. We appreciate the opportunity to participate in the care of this patient. Please do not hesitate to call our office ([phone]) with any issues.    This note has been dictated utilizing voice recognition software. Unfortunately, this may lead to occasional typographical errors. If there are any questions regarding this, please do not hesitate to contact our office.           oLs Nichols PA-C    6/18/2024  3:15 PM

## 2024-08-06 ENCOUNTER — HOSPITAL ENCOUNTER (OUTPATIENT)
Dept: MRI IMAGING | Facility: HOSPITAL | Age: 64
Discharge: HOME | End: 2024-08-06
Attending: INTERNAL MEDICINE
Payer: COMMERCIAL

## 2024-08-06 DIAGNOSIS — N28.89 URETERAL FISTULA: ICD-10-CM

## 2024-08-06 PROCEDURE — 74183 MRI ABD W/O CNTR FLWD CNTR: CPT | Performed by: INTERNAL MEDICINE

## 2024-08-06 PROCEDURE — A9575 INJ GADOTERATE MEGLUMI 0.1ML: HCPCS | Performed by: RADIOLOGY

## 2024-08-06 RX ORDER — GADOTERATE MEGLUMINE 376.9 MG/ML
20 INJECTION INTRAVENOUS
Status: COMPLETED | OUTPATIENT
Start: 2024-08-06 | End: 2024-08-06

## 2024-08-06 RX ADMIN — GADOTERATE MEGLUMINE 20 ML: 376.9 INJECTION INTRAVENOUS at 17:10:00

## 2024-09-05 ENCOUNTER — OFFICE VISIT (OUTPATIENT)
Dept: SURGERY | Facility: CLINIC | Age: 64
End: 2024-09-05
Payer: COMMERCIAL

## 2024-09-05 VITALS — SYSTOLIC BLOOD PRESSURE: 147 MMHG | DIASTOLIC BLOOD PRESSURE: 77 MMHG | HEART RATE: 71 BPM

## 2024-09-05 DIAGNOSIS — R68.82 LOW LIBIDO: ICD-10-CM

## 2024-09-05 DIAGNOSIS — N52.9 ERECTILE DYSFUNCTION, UNSPECIFIED ERECTILE DYSFUNCTION TYPE: ICD-10-CM

## 2024-09-05 DIAGNOSIS — N50.9 SCROTAL SKIN LESION: Primary | ICD-10-CM

## 2024-09-05 PROCEDURE — 99244 OFF/OP CNSLTJ NEW/EST MOD 40: CPT | Performed by: UROLOGY

## 2024-09-05 PROCEDURE — 3077F SYST BP >= 140 MM HG: CPT | Performed by: UROLOGY

## 2024-09-05 PROCEDURE — 3078F DIAST BP <80 MM HG: CPT | Performed by: UROLOGY

## 2024-09-05 RX ORDER — TADALAFIL 20 MG/1
TABLET ORAL
Qty: 10 TABLET | Refills: 0 | Status: SHIPPED | OUTPATIENT
Start: 2024-09-05

## 2024-09-05 NOTE — PROGRESS NOTES
[facility] Urology  Initial Office Consultation    HPI:   Maxwell Marie is a 64 year old male here today for consultation at the request of, and a copy of this note will be sent to, CALDERON CORTEZ MD.    1.  Scrotal Skin Lesion  2.  Low Libido  3.  ED  Maxwell noticed a lesion/swelling in his right hemiscrotal skin about 4 to 5 weeks ago.  No associated pain.  No trauma.  No erythema or redness.  No associated fevers or discharge.  No increase in size.    He also reports low libido and difficulty obtaining and maintaining erections.  He has not tried any medication.  No previous testosterone levels available.    No family history of prostate cancer.  PSA level from October 2023 normal at 0.82 ng/mL.    PAST MEDICAL HISTORY: Hypertension.  Hyperlipidemia.  Gout.  Hypothyroidism. GERD.  Degenerative disc disease.    PAST SURGICAL HISTORY: Back surgery x 2.    SOCIAL HISTORY:  and has 4 children.  No smoking or illicit drug use.  He reports social alcohol.  Works as an anesthesiologist.     Family History   Problem Relation Age of Onset    Kidney Cancer Brother      Allergies: Patient has no known allergies.      REVIEW OF SYSTEMS:  Pertinent positives and negatives per HPI. A 12-point ROS was performed and is otherwise negative.       EXAM:  /77 (BP Location: Left arm, Patient Position: Sitting, Cuff Size: large)   Pulse 71     Physical Exam  Constitutional:       Appearance: He is well-developed.   HENT:      Head: Normocephalic.   Eyes:      General: No scleral icterus.  Cardiovascular:      Rate and Rhythm: Normal rate.   Pulmonary:      Effort: Pulmonary effort is normal.   Abdominal:      General: There is no distension.      Palpations: Abdomen is soft.      Tenderness: There is no abdominal tenderness.   Genitourinary:     Penis: Circumcised. No discharge or lesions.       Testes: Normal.         Right: Mass, tenderness, swelling or testicular hydrocele not present.         Left:  Mass, tenderness, swelling or testicular hydrocele not present.       Skin:     General: Skin is warm and dry.   Neurological:      Mental Status: He is alert and oriented to person, place, and time.   Psychiatric:         Mood and Affect: Mood normal.         Behavior: Behavior normal.       LABS:  See HPI for details.      IMAGING:  No results found.      IMPRESSION:  64 year old male with what appears to be a sebaceous/epidermoid cyst in the right hemiscrotum.    Reviewed with patient at length.  In my opinion, it has the appearance of a subcutaneous sebaceous/epidermoid cyst.  No associated symptoms.  Discussed conservative management versus surgical excision.  Patient agreeable to conservative management.  He will let us know if it increases in size or causes any other symptoms.    Discussed patient's low libido and ED.  Discussed checking a serum testosterone level in the early morning.  He is agreeable.    We discussed management options of ED including oral 5 PDE inhibitors, ICI therapy, and other treatments.  Benefits, risks, and alternatives reviewed.  Patient agreeable to a trial of oral 5 PDE inhibitors.      PLAN:  1.  Conservative management of scrotal skin benign appearing cystic lesion.    2.  Check early morning serum testosterone level.    3.  Trial of generic Cialis 10 to 20 mg as needed for ED.    Christopher Espinoza MD  9/5/2024

## 2024-10-21 ENCOUNTER — TELEPHONE (OUTPATIENT)
Dept: SURGERY | Facility: CLINIC | Age: 64
End: 2024-10-21

## 2024-12-20 ENCOUNTER — TELEPHONE (OUTPATIENT)
Facility: CLINIC | Age: 64
End: 2024-12-20

## 2024-12-20 DIAGNOSIS — Z87.19 HISTORY OF BARRETT'S ESOPHAGUS: Primary | ICD-10-CM

## 2024-12-20 NOTE — TELEPHONE ENCOUNTER
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 He requested an upper endoscopy for hx of Zarate's esophagus. Robbi is asymptomatic, remains on omeprazole. Hx of hiatal hernia noted as well.    Last EGD  in 3/2023 -   FINAL DIAGNOSIS   A.  GASTRIC BIOPSY:-GASTRIC ANTRAL AND OXYNTIC GLAND MUCOSA WITHIN NORMAL   LIMITS-NEGATIVE FOR H. PYLORI   B.  ESOPHAGUS AT 40 CM, BIOPSY:-GASTRIC CARDIAC MUCOSA WITH MILD CHRONIC   INFLAMMATION, NEGATIVE FOR INTESTINAL   METAPLASIA   C. ESOPHAGUS AT 38 CM, BIOPSY:-COLUMNAR MUCOSA WITH INTESTINAL METAPLASIA,   CONSISTENT WITH ZARATE ESOPHAGITIS IF   SUPPORTED BY ENDOSCOPIC FINDINGS-NEGATIVE FOR DYSPLASIA   D.  ESOPHAGUS AT 36 CM, BIOPSY:-COLUMNAR MUCOSA WITH INTESTINAL METAPLASIA,   CONSISTENT WITH ZARATE ESOPHAGITIS IF   SUPPORTED BY ENDOSCOPIC FINDINGS-NEGATIVE FOR DYSPLASIA           GI Scheduling Staff:   -please contact patient next week and schedule him for EGD w/MAC (dx: Zarate's esophagus) in MARCH or April 2025.    Med change: HOLD ozempic or equivalent for 1 week

## 2024-12-21 NOTE — TELEPHONE ENCOUNTER
Scheduled for:  EGD 11238  Provider Name:  Dr Hawley  Date:  4/7/2025  Location:  [facility]  Sedation:  MAC  Time:  11:30 am. (pt is aware that [facility] will call the day before to confirm arrival time)  Prep:  NPO after midnight.  Meds/Allergies Reconciled?:  yes  Diagnosis with codes:    Hx of Helton's Esophagus Z87.19  Was patient informed to call insurance with codes (Y/N):  Yes  Referral sent?:  Referral was sent at the time of electronic surgical scheduling.  [facility] or [facility] notified?:  I sent an electronic request to Endo Scheduling and received a confirmation today.  Medication Orders:  Patient is aware to NOT take iron pills, herbal meds and diet supplements for 7 days before exam.   Hold Ozempic or equivalent (Zepbound) for one week prior to the procedure.  Hold Tadalafil 3 days prior to the procedure.  Also to NOT take any form of alcohol, recreational drugs and any forms of ED meds 24-72 hours before exam.   Misc Orders:  N/A   Further instructions given by staff:  I discussed the prep instructions with the patient which he verbally understood. Patient is aware I will be sending prep instructions via My Chart.

## 2025-04-07 ENCOUNTER — TELEPHONE (OUTPATIENT)
Facility: CLINIC | Age: 65
End: 2025-04-07

## 2025-04-07 DIAGNOSIS — K22.70: Primary | ICD-10-CM

## 2025-04-07 PROCEDURE — 88305 TISSUE EXAM BY PATHOLOGIST: CPT | Performed by: INTERNAL MEDICINE

## 2025-04-07 PROCEDURE — 88312 SPECIAL STAINS GROUP 1: CPT | Performed by: INTERNAL MEDICINE

## 2025-04-07 RX ORDER — PANTOPRAZOLE SODIUM 40 MG/1
40 TABLET, DELAYED RELEASE ORAL
Qty: 180 TABLET | Refills: 3 | Status: SHIPPED | OUTPATIENT
Start: 2025-04-07 | End: 2026-04-02

## 2025-04-23 ENCOUNTER — TELEPHONE (OUTPATIENT)
Facility: CLINIC | Age: 65
End: 2025-04-23

## 2025-04-23 NOTE — TELEPHONE ENCOUNTER
Last EGD done 4/7/25. 3 year recall placed into [facility], next due on 04/2028 per Dr. Hawley. Specialty comments updated.

## 2025-04-23 NOTE — TELEPHONE ENCOUNTER
A. Gastric; biopsy:  Fragments of oxyntic mucosa with mild chronic inactive gastritis  No intestinal metaplasia seen  Diff Quik stain (with appropriate control reactivity) is negative for H pylori microorganisms     B. Esophagus at 40 cm; biopsy:  Squamocolumnar junctional mucosa with incomplete type intestinal metaplasia  Negative for dysplasia     C. Esophagus at 38 cm; biopsy:  Helton mucosa, negative for dysplasia     D. Esophagus at 36 cm; biopsy:  Helton mucosa, negative for dysplasia         D/w Dr. Marie:  -no dysplasia  -continue BID dosing of PPI given long-segment barretts  -repeat EGD in 3 years        GI RN Staff:   Recall EGD in 3 years

## 2025-04-30 ENCOUNTER — TELEPHONE (OUTPATIENT)
Facility: CLINIC | Age: 65
End: 2025-04-30

## 2025-05-21 ENCOUNTER — TELEPHONE (OUTPATIENT)
Facility: CLINIC | Age: 65
End: 2025-05-21

## 2025-06-10 ENCOUNTER — HOSPITAL ENCOUNTER (EMERGENCY)
Facility: HOSPITAL | Age: 65
Discharge: LEFT BEFORE DISCHARGE | End: 2025-06-10
Payer: COMMERCIAL

## 2025-06-10 VITALS
HEART RATE: 82 BPM | WEIGHT: 240 LBS | OXYGEN SATURATION: 98 % | DIASTOLIC BLOOD PRESSURE: 80 MMHG | BODY MASS INDEX: 31 KG/M2 | RESPIRATION RATE: 18 BRPM | SYSTOLIC BLOOD PRESSURE: 140 MMHG | TEMPERATURE: 98 F

## (undated) DEVICE — SPK10186 WILSON TABLE KIT: Brand: SPK10186 WILSON TABLE KIT

## (undated) DEVICE — DISPOSABLE SLIM BIPOLAR FORCEPS, NON-STICK,: Brand: SPETZLER-MALIS

## (undated) DEVICE — APPLICATOR PREP 26ML CHG 2% ISO ALC 70%

## (undated) DEVICE — PENCIL ES BTTN SWCH W/ TIP HOLSTER E-Z CLN

## (undated) DEVICE — SUT MCRYL 3-0 18IN PS-2 ABSRB UD 19MM 3/8 CIR

## (undated) DEVICE — PACK CDS LAMINECTOMY

## (undated) DEVICE — ADHESIVE SKIN TOP FOR WND CLSR DERMBND ADV

## (undated) DEVICE — SUT VCRL 0 L27IN ABSRB VLT L26MM UR-6 5/8-ZZDISC-USE 421016

## (undated) DEVICE — SOLUTION IRRIG 1000ML 0.9% NACL USP BTL

## (undated) DEVICE — INTENDED USE FOR SURGICAL MARKING ON INTACT SKIN, ALSO PROVIDES A PERMANENT METHOD OF IDENTIFYING OBJECTS IN THE OPERATING ROOM: Brand: WRITESITE® PLUS MINI PREP RESISTANT MARKER

## (undated) DEVICE — KIT HEMSTAT MTRX 8ML PORCINE GEL HUM THROM

## (undated) DEVICE — GLOVE SUR 8 SENSICARE NEOPR PWD F

## (undated) DEVICE — ELECTRODE ES L16.5CM BLDE MPLR OPN APPRCH EZ

## (undated) DEVICE — SUT VCRL 3-0 18IN CP-2 ABSRB UD CR L26MM 1/2

## (undated) DEVICE — GLOVE SUR 7.5 SENSICARE PI MIC PIP CRM PWD F

## (undated) DEVICE — PRECISION MATCH HEAD

## (undated) DEVICE — GLOVE SUR 8 SENSICARE PI MIC PIP CRM PWD F

## (undated) DEVICE — MICRO KOVER: Brand: UNBRANDED

## (undated) DEVICE — WRAP COOLING BACK W/NO PILLOW

## (undated) NOTE — LETTER
[place] [address]  Authorization for Invasive Procedures  Date: 4/25/2024         Time: 11:13 AM    I hereby authorize Dr. Kenneth Liao, my physician and his/her assistants (if applicable), which may include medical students, residents, and/or fellows, to perform the following surgical operation/ procedure and administer such anesthesia as may be determined necessary by my physician: Left Lumbar 4 - Lumbar 5 Microdiscectomy on Maxwell Cameron  2.   I recognize that during the surgical operation/procedure, unforeseen conditions may necessitate additional or different procedures than those listed above.  I, therefore, further authorize and request that the above-named surgeon, assistants, or designees perform such procedures as are, in their judgment, necessary and desirable.    3.   My surgeon/physician has discussed prior to my surgery the potential benefits, risks and side effects of this procedure; the likelihood of achieving goals; and potential problems that might occur during recuperation.  They also discussed reasonable alternatives to the procedure, including risks, benefits, and side effects related to the alternatives and risks related to not receiving this procedure.  I have had all my questions answered and I acknowledge that no guarantee has been made as to the result that may be obtained.    4.   Should the need arise during my operation/procedure, which includes change of level of care prior to discharge, I also consent to the administration of blood and/or blood products.  Further, I understand that despite careful testing and screening of blood or blood products by collecting agencies, I may still be subject to ill effects as a result of receiving a blood transfusion and/or blood products.  The following are some, but not all, of the potential risks that can occur: fever and allergic reactions, hemolytic reactions, transmission of diseases such as Hepatitis, AIDS and  Cytomegalovirus (CMV) and fluid overload.  In the event that I wish to have an autologous transfusion of my own blood, or a directed donor transfusion, I will discuss this with my physician.   Check only if Refusing Blood or Blood Products  I understand refusal of blood or blood products as deemed necessary by my physician may have serious consequences to my condition to include possible death. I hereby assume responsibility for my refusal and release the hospital, its personnel, and my physicians from any responsibility for the consequences of my refusal.         o  Refuse         5.   I authorize the use of any specimen, organs, tissues, body parts or foreign objects that may be removed from my body during the operation/procedure for diagnosis, research or teaching purposes and their subsequent disposal by hospital authorities.  I also authorize the release of specimen test results and/or written reports to my treating physician on the hospital medical staff or other referring or consulting physicians involved in my care, at the discretion of the Pathologist or my treating physician.    6.   I consent to the photographing or videotaping of the operations or procedures to be performed, including appropriate portions of my body for medical, scientific, or educational purposes, provided my identity is not revealed by the pictures or by descriptive texts accompanying them.  If the procedure has been photographed/videotaped, the surgeon will obtain the original picture, image, videotape or CD.  The hospital will not be responsible for storage, release or maintenance of the picture, image, tape or CD.    7.   I consent to the presence of a  or observers in the operating room as deemed necessary by my physician or their designees.    8.   I recognize that in the event my procedure results in extended X-Ray/fluoroscopy time, I may develop a skin reaction.    9. If I have a Do Not Attempt Resuscitation  (DNAR) order in place, that status will be suspended while in the operating room, procedural suite, and during the recovery period unless otherwise explicitly stated by me (or a person authorized to consent on my behalf). The surgeon or my attending physician will determine when the applicable recovery period ends for purposes of reinstating the DNAR order.  10. Patients having a sterilization procedure: I understand that if the procedure is successful the results will be permanent and it will therefore be impossible for me to inseminate, conceive, or bear children.  I also understand that the procedure is intended to result in sterility, although the result has not been guaranteed.   11. I acknowledge that my physician has explained sedation/analgesia administration to me including the risk and benefits I consent to the administration of sedation/analgesia as may be necessary or desirable in the judgment of my physician.    I CERTIFY THAT I HAVE READ AND FULLY UNDERSTAND THE ABOVE CONSENT TO OPERATION and/or OTHER PROCEDURE.        ____________________________________       _________________________________      ______________________________  Signature of Patient         Signature of Responsible Person        Printed Name of Responsible Person        ____________________________________      _________________________________      ______________________________       Signature of Witness          Relationship to Patient                       Date                                       Time  Patient Name: Maxwell Cameron  : 1960    Reviewed: 2024   Printed: 2024  Medical Record #: [id number] Page 1 of 2             STATEMENT OF PHYSICIAN My signature below affirms that prior to the time of the procedure; I have explained to the patient and/or his/her legal representative, the risks and benefits involved in the proposed treatment and any reasonable alternative to the proposed treatment. I have  also explained the risks and benefits involved in refusal of the proposed treatment and alternatives to the proposed treatment and have answered the patient's questions. If I have a significant financial interest in a co-management agreement or a significant financial interest in any product or implant, or other significant relationship used in this procedure/surgery, I have disclosed this and had a discussion with my patient.     _______________________________________________________________ _____________________________  (Signature of Physician)                                                                                         (Date)                                   (Time)  Patient Name: Maxwell Cameron  : 1960    Reviewed: 2024   Printed: 2024  Medical Record #: [id number] Page 2 of 2

## (undated) NOTE — LETTER
10/21/24        Maxwell Marie  [facility] [address] [facility] [address]      Dear Maxwell,    Our records indicate that you have outstanding lab work and or testing that was ordered for you and has not yet been completed:  Testosterone Total   To provide you with the best possible care, please complete these orders at your earliest convenience. If you have recently completed these orders please disregard this letter.     If you have any questions please call the office at Dept: [phone].     Thank you,       Christopher Espinoza MD

## (undated) NOTE — LETTER
[address], [place], [place]  Authorization for Surgical Operation and Procedure                                                                                           I hereby authorize Kenneth Liao MD, my physician and his/her assistants (if applicable), which may include medical students, residents, and/or fellows, to perform the following surgical operation/ procedure and administer such anesthesia as may be determined necessary by my physician: Operation/Procedure name (s) Lumbar L4-5 laminectomy with medial facetectomy and discectomy on Maxwell Cameron   2.   I recognize that during the surgical operation/procedure, unforeseen conditions may necessitate additional or different procedures than those listed above.  I, therefore, further authorize and request that the above-named surgeon, assistants, or designees perform such procedures as are, in their judgment, necessary and desirable.    3.   My surgeon/physician has discussed prior to my surgery the potential benefits, risks and side effects of this procedure; the likelihood of achieving goals; and potential problems that might occur during recuperation.  They also discussed reasonable alternatives to the procedure, including risks, benefits, and side effects related to the alternatives and risks related to not receiving this procedure.  I have had all my questions answered and I acknowledge that no guarantee has been made as to the result that may be obtained.    4.   Should the need arise during my operation/procedure, which includes change of level of care prior to discharge, I also consent to the administration of blood and/or blood products.  Further, I understand that despite careful testing and screening of blood or blood products by collecting agencies, I may still be subject to ill effects as a result of receiving a blood transfusion and/or blood products.  The following are some, but not all, of the potential risks  that can occur: fever and allergic reactions, hemolytic reactions, transmission of diseases such as Hepatitis, AIDS and Cytomegalovirus (CMV) and fluid overload.  In the event that I wish to have an autologous transfusion of my own blood, or a directed donor transfusion, I will discuss this with my physician.  Check only if Refusing Blood or Blood Products  I understand refusal of blood or blood products as deemed necessary by my physician may have serious consequences to my condition to include possible death. I hereby assume responsibility for my refusal and release the hospital, its personnel, and my physicians from any responsibility for the consequences of my refusal.    o  Refuse   5.   I authorize the use of any specimen, organs, tissues, body parts or foreign objects that may be removed from my body during the operation/procedure for diagnosis, research or teaching purposes and their subsequent disposal by hospital authorities.  I also authorize the release of specimen test results and/or written reports to my treating physician on the hospital medical staff or other referring or consulting physicians involved in my care, at the discretion of the Pathologist or my treating physician.    6.   I consent to the photographing or videotaping of the operations or procedures to be performed, including appropriate portions of my body for medical, scientific, or educational purposes, provided my identity is not revealed by the pictures or by descriptive texts accompanying them.  If the procedure has been photographed/videotaped, the surgeon will obtain the original picture, image, videotape or CD.  The hospital will not be responsible for storage, release or maintenance of the picture, image, tape or CD.    7.   I consent to the presence of a  or observers in the operating room as deemed necessary by my physician or their designees.    8.   I recognize that in the event my procedure results in  extended X-Ray/fluoroscopy time, I may develop a skin reaction.    9. If I have a Do Not Attempt Resuscitation (DNAR) order in place, that status will be suspended while in the operating room, procedural suite, and during the recovery period unless otherwise explicitly stated by me (or a person authorized to consent on my behalf). The surgeon or my attending physician will determine when the applicable recovery period ends for purposes of reinstating the DNAR order.  10. Patients having a sterilization procedure: I understand that if the procedure is successful the results will be permanent and it will therefore be impossible for me to inseminate, conceive, or bear children.  I also understand that the procedure is intended to result in sterility, although the result has not been guaranteed.   11. I acknowledge that my physician has explained sedation/analgesia administration to me including the risk and benefits I consent to the administration of sedation/analgesia as may be necessary or desirable in the judgment of my physician.    I CERTIFY THAT I HAVE READ AND FULLY UNDERSTAND THE ABOVE CONSENT TO OPERATION and/or OTHER PROCEDURE.     _________________________________________ _________________________________     ___________________________________  Signature of Patient     Signature of Responsible Person                   Printed Name of Responsible Person                              _________________________________________ ______________________________        ___________________________________  Signature of Witness         Date  Time         Relationship to Patient    STATEMENT OF PHYSICIAN My signature below affirms that prior to the time of the procedure; I have explained to the patient and/or his/her legal representative, the risks and benefits involved in the proposed treatment and any reasonable alternative to the proposed treatment. I have also explained the risks and benefits involved in refusal of  the proposed treatment and alternatives to the proposed treatment and have answered the patient's questions. If I have a significant financial interest in a co-management agreement or a significant financial interest in any product or implant, or other significant relationship used in this procedure/surgery, I have disclosed this and had a discussion with my patient.     _______________________________________________________________ _____________________________  (Signature of Physician)                                                                                         (Date)                                   (Time)  Patient Name: Maxwell Arnoldn    : 1960   Printed: 2024      Medical Record #: [id number]                                              Page 1 of 1

## (undated) NOTE — LETTER
[facility]  Administration of Anesthesia  I, Maxwell Cameron agree to be cared for by a physician anesthesiologist alone and/or with a nurse anesthetist, who is specially trained to monitor me and give me medicine to put me to sleep or keep me comfortable during my procedure    I understand that my anesthesiologist and/or anesthetist is not an employee or agent of [facility] or [employer]. He or she works for [facility]    As the patient asking for anesthesia services, I agree to:  Allow the anesthesiologist (anesthesia doctor) to give me medicine and do additional procedures as necessary. Some examples are: Starting or using an “IV” to give me medicine, fluids or blood during my procedure, and having a breathing tube placed to help me breathe when I’m asleep (intubation). In the event that my heart stops working properly, I understand that my anesthesiologist will make every effort to sustain my life, unless otherwise directed by [facility] Do Not Resuscitate documents.  Tell my anesthesia doctor before my procedure:  If I am pregnant.  The last time that I ate or drank.  iii. All of the medicines I take (including prescriptions, herbal supplements, and pills I can buy without a prescription (including street drugs/illegal medications). Failure to inform my anesthesiologist about these medicines may increase my risk of anesthetic complications.  iv.If I am allergic to anything or have had a reaction to anesthesia before.  I understand how the anesthesia medicine will help me (benefits).  I understand that with any type of anesthesia medicine there are risks:  The most common risks are: nausea, vomiting, sore throat, muscle soreness, damage to my eyes, mouth, or teeth (from breathing tube placement).  Rare risks include: remembering what happened during my procedure, allergic reactions to medications, injury to my airway, heart, lungs, vision, nerves, or muscles  and in extremely rare instances death.  My doctor has explained to me other choices available to me for my care (alternatives).  Pregnant Patients (“epidural”):  I understand that the risks of having an epidural (medicine given into my back to help control pain during labor), include itching, low blood pressure, difficulty urinating, headache or slowing of the baby’s heart. Very rare risks include infection, bleeding, seizure, irregular heart rhythms and nerve injury.  Regional Anesthesia (“spinal”, “epidural”, & “nerve blocks”):  I understand that rare but potential complications include headache, bleeding, infection, seizure, irregular heart rhythms, and nerve injury.    _____________________________________________________________________________  Patient (or Representative) Signature/Relationship to Patient  Date   Time    _____________________________________________________________________________   Name (if used)    Language/Organization   Time    _____________________________________________________________________________  Nurse Anesthetist Signature     Date   Time  _____________________________________________________________________________  Anesthesiologist Signature     Date   Time  I have discussed the procedure and information above with the patient (or patient’s representative) and answered their questions. The patient or their representative has agreed to have anesthesia services.    _____________________________________________________________________________  Witness        Date   Time  I have verified that the signature is that of the patient or patient’s representative, and that it was signed before the procedure  Patient Name: Maxwell Cameron     : 1960                 Printed: 2024 at 9:31 AM    Medical Record #: [id number]                                            Page 1 of 1  ----------ANESTHESIA CONSENT----------

## (undated) NOTE — [place] [address]  Authorization for Invasive Procedures  Date: 4/26/2024         Time: 7:44 am    I hereby authorize Dr. Kenneth Liao, my physician and his/her assistants (if applicable), which may include medical students, residents, and/or fellows, to perform the following surgical operation/ procedure and administer such anesthesia as may be determined necessary by my physician: Left Lumbar 4 - Lumbar 5 - Sacral 1 Microdiscectomy on Maxwell Cameron  2.   I recognize that during the surgical operation/procedure, unforeseen conditions may necessitate additional or different procedures than those listed above.  I, therefore, further authorize and request that the above-named surgeon, assistants, or designees perform such procedures as are, in their judgment, necessary and desirable.    3.   My surgeon/physician has discussed prior to my surgery the potential benefits, risks and side effects of this procedure; the likelihood of achieving goals; and potential problems that might occur during recuperation.  They also discussed reasonable alternatives to the procedure, including risks, benefits, and side effects related to the alternatives and risks related to not receiving this procedure.  I have had all my questions answered and I acknowledge that no guarantee has been made as to the result that may be obtained.    4.   Should the need arise during my operation/procedure, which includes change of level of care prior to discharge, I also consent to the administration of blood and/or blood products.  Further, I understand that despite careful testing and screening of blood or blood products by collecting agencies, I may still be subject to ill effects as a result of receiving a blood transfusion and/or blood products.  The following are some, but not all, of the potential risks that can occur: fever and allergic reactions, hemolytic reactions, transmission of diseases such as Hepatitis, AIDS and  Cytomegalovirus (CMV) and fluid overload.  In the event that I wish to have an autologous transfusion of my own blood, or a directed donor transfusion, I will discuss this with my physician.   Check only if Refusing Blood or Blood Products  I understand refusal of blood or blood products as deemed necessary by my physician may have serious consequences to my condition to include possible death. I hereby assume responsibility for my refusal and release the hospital, its personnel, and my physicians from any responsibility for the consequences of my refusal.         o  Refuse         5.   I authorize the use of any specimen, organs, tissues, body parts or foreign objects that may be removed from my body during the operation/procedure for diagnosis, research or teaching purposes and their subsequent disposal by hospital authorities.  I also authorize the release of specimen test results and/or written reports to my treating physician on the hospital medical staff or other referring or consulting physicians involved in my care, at the discretion of the Pathologist or my treating physician.    6.   I consent to the photographing or videotaping of the operations or procedures to be performed, including appropriate portions of my body for medical, scientific, or educational purposes, provided my identity is not revealed by the pictures or by descriptive texts accompanying them.  If the procedure has been photographed/videotaped, the surgeon will obtain the original picture, image, videotape or CD.  The hospital will not be responsible for storage, release or maintenance of the picture, image, tape or CD.    7.   I consent to the presence of a  or observers in the operating room as deemed necessary by my physician or their designees.    8.   I recognize that in the event my procedure results in extended X-Ray/fluoroscopy time, I may develop a skin reaction.    9. If I have a Do Not Attempt Resuscitation  (DNAR) order in place, that status will be suspended while in the operating room, procedural suite, and during the recovery period unless otherwise explicitly stated by me (or a person authorized to consent on my behalf). The surgeon or my attending physician will determine when the applicable recovery period ends for purposes of reinstating the DNAR order.  10. Patients having a sterilization procedure: I understand that if the procedure is successful the results will be permanent and it will therefore be impossible for me to inseminate, conceive, or bear children.  I also understand that the procedure is intended to result in sterility, although the result has not been guaranteed.   11. I acknowledge that my physician has explained sedation/analgesia administration to me including the risk and benefits I consent to the administration of sedation/analgesia as may be necessary or desirable in the judgment of my physician.    I CERTIFY THAT I HAVE READ AND FULLY UNDERSTAND THE ABOVE CONSENT TO OPERATION and/or OTHER PROCEDURE.        ____________________________________       _________________________________      ______________________________  Signature of Patient         Signature of Responsible Person        Printed Name of Responsible Person        ____________________________________      _________________________________      ______________________________       Signature of Witness          Relationship to Patient                       Date                                       Time  Patient Name: Maxwell Cameron  : 1960    Reviewed: 2024   Printed: 2024  Medical Record #: [id number] Page 1 of 2             STATEMENT OF PHYSICIAN My signature below affirms that prior to the time of the procedure; I have explained to the patient and/or his/her legal representative, the risks and benefits involved in the proposed treatment and any reasonable alternative to the proposed treatment. I have  also explained the risks and benefits involved in refusal of the proposed treatment and alternatives to the proposed treatment and have answered the patient's questions. If I have a significant financial interest in a co-management agreement or a significant financial interest in any product or implant, or other significant relationship used in this procedure/surgery, I have disclosed this and had a discussion with my patient.     _______________________________________________________________ _____________________________  (Signature of Physician)                                                                                         (Date)                                   (Time)  Patient Name: Maxwell Cameron  : 1960    Reviewed: 2024   Printed: 2024  Medical Record #: [id number] Page 2 of 2

## (undated) NOTE — LETTER
[facility]  Administration of Anesthesia  I, Maxwell Cameron agree to be cared for by a physician anesthesiologist alone and/or with a nurse anesthetist, who is specially trained to monitor me and give me medicine to put me to sleep or keep me comfortable during my procedure    I understand that my anesthesiologist and/or anesthetist is not an employee or agent of [facility] or [employer]. He or she works for [facility] [employer]    As the patient asking for anesthesia services, I agree to:  Allow the anesthesiologist (anesthesia doctor) to give me medicine and do additional procedures as necessary. Some examples are: Starting or using an “IV” to give me medicine, fluids or blood during my procedure, and having a breathing tube placed to help me breathe when I’m asleep (intubation). In the event that my heart stops working properly, I understand that my anesthesiologist will make every effort to sustain my life, unless otherwise directed by [facility] Do Not Resuscitate documents.  Tell my anesthesia doctor before my procedure:  If I am pregnant.  The last time that I ate or drank.  iii. All of the medicines I take (including prescriptions, herbal supplements, and pills I can buy without a prescription (including street drugs/illegal medications). Failure to inform my anesthesiologist about these medicines may increase my risk of anesthetic complications.  iv.If I am allergic to anything or have had a reaction to anesthesia before.  I understand how the anesthesia medicine will help me (benefits).  I understand that with any type of anesthesia medicine there are risks:  The most common risks are: nausea, vomiting, sore throat, muscle soreness, damage to my eyes, mouth, or teeth (from breathing tube placement).  Rare risks include: remembering what happened during my procedure, allergic reactions to medications, injury to my airway, heart, lungs, vision, nerves, or muscles  and in extremely rare instances death.  My doctor has explained to me other choices available to me for my care (alternatives).  Pregnant Patients (“epidural”):  I understand that the risks of having an epidural (medicine given into my back to help control pain during labor), include itching, low blood pressure, difficulty urinating, headache or slowing of the baby’s heart. Very rare risks include infection, bleeding, seizure, irregular heart rhythms and nerve injury.  Regional Anesthesia (“spinal”, “epidural”, & “nerve blocks”):  I understand that rare but potential complications include headache, bleeding, infection, seizure, irregular heart rhythms, and nerve injury.    _____________________________________________________________________________  Patient (or Representative) Signature/Relationship to Patient  Date   Time    _____________________________________________________________________________   Name (if used)    Language/Organization   Time    _____________________________________________________________________________  Nurse Anesthetist Signature     Date   Time  _____________________________________________________________________________  Anesthesiologist Signature     Date   Time  I have discussed the procedure and information above with the patient (or patient’s representative) and answered their questions. The patient or their representative has agreed to have anesthesia services.    _____________________________________________________________________________  Witness        Date   Time  I have verified that the signature is that of the patient or patient’s representative, and that it was signed before the procedure  Patient Name: Maxwell Cameron     : 1960                 Printed: 2024 at 8:59 AM    Medical Record #: [id number]                                            Page 1 of 2  ----------ANESTHESIA CONSENT----------  [address]   [address]    Consent for Anesthesia    IMaxwell agree to be cared for by a physician anesthesiologist alone and/or with a nurse anesthetist, who is specially trained to monitor me and give me medicine to put me to sleep or keep me comfortable during my procedure    I understand that my anesthesiologist and/or anesthetist is not an employee or agent of [facility] or [employer]. He or she works for [employer].    As the patient asking for anesthesia services, I agree to:  Allow the anesthesiologist (anesthesia doctor) to give me medicine and do additional procedures as necessary. Some examples are: Starting or using an “IV” to give me medicine, fluids or blood during my procedure, and having a breathing tube placed to help me breathe when I’m asleep (intubation). In the event that my heart stops working properly, I understand that my anesthesiologist will make every effort to sustain my life, unless otherwise directed by [facility] Do Not Resuscitate documents.  Tell my anesthesia doctor before my procedure:   If I am pregnant.   The last time that I ate or drank.  iii. All of the medicines I take (including prescriptions, herbal supplements, and pills I can buy without a prescription (including street drugs/illegal medications). Failure to inform my anesthesiologist about these medicines may increase my risk of anesthetic complications.  Iv.If I am allergic to anything or have had a reaction to anesthesia before.  I understand how the anesthesia medicine will help me (benefits).  I understand that with any type of anesthesia medicine there are risks:  The most common risks are: nausea, vomiting, sore throat, muscle soreness, damage to my eyes, mouth, or teeth (from breathing tube placement).  Rare risks include: remembering what happened during my procedure, allergic reactions to medications, injury to my airway, heart, lungs, vision, nerves, or muscles and in extremely  rare instances death.  My doctor has explained to me other choices available to me for my care (alternatives).  Pregnant Patients (“epidural”):  I understand that the risks of having an epidural (medicine given into my back to help control pain during labor), include itching, low blood pressure, difficulty urinating, headache or slowing of the baby’s heart. Very rare risks include infection, bleeding, seizure, irregular heart rhythms and nerve injury.  Regional Anesthesia (“spinal”, “epidural”, & “nerve blocks”):  I understand that rare but potential complications include headache, bleeding, infection, seizure, irregular heart rhythms, and nerve injury.    _____________________________________________________________________________  Patient (or Representative) Signature/Relationship to Patient  Date   Time    _____________________________________________________________________________   Name (if used)    Language/Organization   Time    _____________________________________________________________________________  Nurse Anesthetist Signature     Date   Time    ______________________________________________________________________________  Anesthesiologist Signature     Date   Time  I have discussed the procedure and information above with the patient (or patient’s representative) and answered their questions. The patient or their representative has agreed to have anesthesia services.    _____________________________________________________________________________  Witness       Date   Time  I have verified that the signature is that of the patient or patient’s representative, and that it was signed before the procedure    Patient Name: Maxwell Cameron     : 1960                 Printed: 2024 at 8:59 AM    Medical Record #: [id number]                                            Page 2 of 2